# Patient Record
Sex: FEMALE | Race: WHITE | NOT HISPANIC OR LATINO | Employment: FULL TIME | URBAN - METROPOLITAN AREA
[De-identification: names, ages, dates, MRNs, and addresses within clinical notes are randomized per-mention and may not be internally consistent; named-entity substitution may affect disease eponyms.]

---

## 2018-08-22 ENCOUNTER — TRANSFERRED RECORDS (OUTPATIENT)
Dept: HEALTH INFORMATION MANAGEMENT | Facility: CLINIC | Age: 33
End: 2018-08-22

## 2018-09-11 ENCOUNTER — HOSPITAL ENCOUNTER (OUTPATIENT)
Dept: PHYSICAL THERAPY | Facility: CLINIC | Age: 33
Setting detail: THERAPIES SERIES
End: 2018-09-11
Attending: PHYSICAL MEDICINE & REHABILITATION
Payer: COMMERCIAL

## 2018-09-11 PROCEDURE — 40000718 ZZHC STATISTIC PT DEPARTMENT ORTHO VISIT: Performed by: PHYSICAL THERAPIST

## 2018-09-11 PROCEDURE — 97162 PT EVAL MOD COMPLEX 30 MIN: CPT | Mod: GP | Performed by: PHYSICAL THERAPIST

## 2018-09-11 PROCEDURE — 97110 THERAPEUTIC EXERCISES: CPT | Mod: GP | Performed by: PHYSICAL THERAPIST
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
## 2018-09-24 ENCOUNTER — HOSPITAL ENCOUNTER (OUTPATIENT)
Dept: PHYSICAL THERAPY | Facility: CLINIC | Age: 33
Setting detail: THERAPIES SERIES
End: 2018-09-24
Attending: PHYSICAL MEDICINE & REHABILITATION
Payer: COMMERCIAL

## 2018-09-24 PROCEDURE — 97112 NEUROMUSCULAR REEDUCATION: CPT | Mod: GP | Performed by: PHYSICAL THERAPIST

## 2018-09-24 PROCEDURE — 97110 THERAPEUTIC EXERCISES: CPT | Mod: GP | Performed by: PHYSICAL THERAPIST

## 2018-09-24 PROCEDURE — 40000718 ZZHC STATISTIC PT DEPARTMENT ORTHO VISIT: Performed by: PHYSICAL THERAPIST

## 2018-09-25 ENCOUNTER — HEALTH MAINTENANCE LETTER (OUTPATIENT)
Age: 33
End: 2018-09-25

## 2018-10-24 NOTE — ADDENDUM NOTE
Encounter addended by: Quin Skaggs PT on: 10/24/2018  9:47 AM<BR>     Actions taken: Sign clinical note, Episode resolved

## 2018-10-24 NOTE — DISCHARGE SUMMARY
"Outpatient Physical Therapy Discharge Note     Patient: Patricia Solano  : 1985    Beginning/End Dates of Reporting Period:  2 visits 18 and 18. She canceled or did not keep ( scheduled appt) the rest of her appts    Referring Provider: Dr. Georgia Cervantes    Therapy Diagnosis: Mechanical low back pain     Client Self Report: At the time of her last session, she reported:   She reports her neck \"cannot be touched\" right now with CTR 2018 and R a few weeks ago. She may need another neck surgery. She reprots neck was \"crushed\" onto spinal cord and she was getting \"shocks\". Reports she could be \"paralyzed\" with one wrong move and the that she is \"a mess\". She has a fractured tailbone from labor. She also thinks she has a rotated pelvis. She cannot complete positioning every 2 hours and completes it around 6 times per day. Sometimes the position is helpful and is based on what she was doing. It can make it worse at times - focuses on it. She pushes through her symptoms.     Objective Measurements:      Objective Measure: pain  Details: Back: now: 4/10 and 9/10; Neck: now:    Outcome Measures (most recent score):  Sandra STarT Sub-Score (Q5-9): 3  Sandra STarT Total Score (all 9): 6       Goals:  Goal Identifier 1   Goal Description Pt will improve her score on the Oswestry Disability Index (TACO) to 25% or less indicating a clinically meaningful improvement in overall function.   Target Date 18   Date Met      Progress:     Goal Identifier 2   Goal Description Pt will report a 30% or greater improvement in sitting and standing tolerance in order to stand for housework tasks and sit for driving or watching tv.   Target Date 18   Date Met      Progress:     Goal Identifier 3   Goal Description Pt will demonstrate independence with HEP and self-care management in order to reduce future incidences of LBP.   Target Date     Date Met      Progress:     Progress Toward Goals:   Not " assessed this period.  Plan:  Discharge from therapy.    Discharge:    Reason for Discharge: She did not keep her appts.     Equipment Issued: None    Discharge Plan: Did not complete her PT.     Thank you for referring Patricia  to [facility]    Quin Skaggs, PT  [phone]

## 2018-11-13 ENCOUNTER — MEDICAL CORRESPONDENCE (OUTPATIENT)
Dept: HEALTH INFORMATION MANAGEMENT | Facility: CLINIC | Age: 33
End: 2018-11-13

## 2018-11-13 ENCOUNTER — TRANSFERRED RECORDS (OUTPATIENT)
Dept: HEALTH INFORMATION MANAGEMENT | Facility: CLINIC | Age: 33
End: 2018-11-13

## 2018-11-18 ENCOUNTER — HOSPITAL ENCOUNTER (EMERGENCY)
Facility: CLINIC | Age: 33
End: 2018-11-18
Payer: MEDICAID

## 2018-11-18 ENCOUNTER — HOSPITAL ENCOUNTER (EMERGENCY)
Facility: CLINIC | Age: 33
Discharge: HOME | End: 2018-11-18
Attending: PHYSICIAN ASSISTANT | Admitting: PHYSICIAN ASSISTANT
Payer: COMMERCIAL

## 2018-11-18 VITALS
RESPIRATION RATE: 20 BRPM | OXYGEN SATURATION: 99 % | TEMPERATURE: 97.3 F | HEART RATE: 85 BPM | SYSTOLIC BLOOD PRESSURE: 152 MMHG | DIASTOLIC BLOOD PRESSURE: 72 MMHG

## 2018-11-18 DIAGNOSIS — B37.0 CANDIDIASIS OF MOUTH: ICD-10-CM

## 2018-11-18 PROCEDURE — 99284 EMERGENCY DEPT VISIT MOD MDM: CPT | Mod: Z6 | Performed by: PHYSICIAN ASSISTANT

## 2018-11-18 PROCEDURE — 99283 EMERGENCY DEPT VISIT LOW MDM: CPT | Performed by: PHYSICIAN ASSISTANT

## 2018-11-18 RX ORDER — FLUCONAZOLE 100 MG/1
TABLET ORAL
Qty: 11 TABLET | Refills: 0 | Status: SHIPPED | OUTPATIENT
Start: 2018-11-18 | End: 2018-12-14

## 2018-11-18 NOTE — ED AVS SNAPSHOT
[facility] Emergency Department    [address] [place] [address]    Phone:  [phone]    Fax:  [phone]                                       Patricia Solano   MRN: [id number]    Department:  [facility] Emergency Department   Date of Visit:  11/18/2018           After Visit Summary Signature Page     I have received my discharge instructions, and my questions have been answered. I have discussed any challenges I see with this plan with the nurse or doctor.    ..........................................................................................................................................  Patient/Patient Representative Signature      ..........................................................................................................................................  Patient Representative Print Name and Relationship to Patient    ..................................................               ................................................  Date                                   Time    ..........................................................................................................................................  Reviewed by Signature/Title    ...................................................              ..............................................  Date                                               Time          22EPIC Rev 08/18

## 2018-11-18 NOTE — ED AVS SNAPSHOT
[facility] Emergency Department    [address] [place] [address]    Phone:  [phone]    Fax:  [phone]                                       Patricia Solano   MRN: [id number]    Department:  [facility] Emergency Department   Date of Visit:  11/18/2018           Patient Information     Date Of Birth          1985        Your diagnoses for this visit were:     Candidiasis of mouth        You were seen by Darien Rodriguez PA-C.      Follow-up Information     Follow up with Georgia Cervantes    Specialty:  Physical Medicine and Rehab    Why:  As needed, If symptoms worsen or not improving    Contact information:    [facility]  [address]  [place] [address]  [phone]          Discharge Instructions       It was a pleasure working with you today!  I hope your condition improves rapidly!     Please start the Fluconazole to treat the thrush.  The Magic Mouthwash will help to control your symptoms initially while the other medication is setting in.  Please return for a recheck visit if you are not improving, or worsening.         Candida Infection: Thrush  Thrush is a fungal infection in the mouth and throat. Thrush does not usually affect healthy adults. It is more common in people with a weak immune system. It is also more likely if you take antibiotics. Thrush is normally not contagious.  Understanding fungus in the mouth and throat  Your mouth and throat normally contain millions of tiny organisms. These include bacteria and yeasts. Many of these do not cause any problems. In fact, they may help fight disease.  Yeasts are a type of fungus. A type of yeast called Candida normally lives on the membranes of your mouth and throat. Usually, this yeast grows only in small amounts and is harmless. But in some cases, Candida can grow out of control and cause thrush. Thrush is related to other kinds of Candida infections that can grow all over the  body. Thrush refers to an infection of only the mouth and throat.  What causes thrush?  Thrush happens when something lets too much Candida grow inside your mouth and throat. Certain things that change the normal balance of organisms in the mouth can lead to thrush. One example is antibiotic medicine. This medicine may kill some of the normal bacteria in your mouth. Candida can then grow freely. People on antibiotics have an increased risk for thrush.  You have a higher risk for thrush if you:    Wear dentures    Are getting chemotherapy    Are getting radiation therapy    Have diabetes    Have a transplanted organ    Use corticosteroids, including inhaled corticosteroids for lung disease    Have a weak immune system, such as from AIDS    Are an older adult  Symptoms of thrush  Symptoms of thrush can include:    A dry, cottony feeling in your mouth    Cracking at the corners of the mouth    Loss of taste    Pain while eating or swallowing    White patches on the tongue and around the sides of the mouth  Diagnosing thrush  Your healthcare provider will ask about your medical history and your symptoms. He or she will look closely at your mouth and throat. White or red patches will be scraped with a tongue depressor. The sample will be sent to a lab to test. This test can usually confirm thrush.  If you have thrush, you may also have esophageal candidiasis. This is common in people who have HIV or a weak immune system. Your healthcare provider may check for this condition with an upper endoscopy. This is a procedure to look at the esophagus. A tissue sample may be taken to test.  Treatment for thrush  Thrush is usually treated with antifungal medicine. The medicine is put directly in your mouth and throat. You may be given a  swish and swallow  medicine or an antifungal lozenge.  In some cases, you may need an antifungal pill. This can remove Candida throughout your body. Or you may need medicine through an intravenous  line ( IV). These treatments depend on how severe your infection is, and what other health conditions you have.  If you are at high risk for thrush, you may need to keep taking oral antifungal medicine. This is to help prevent thrush in the future.  What happens if you don t get treated for thrush?  If untreated, the Candida may spread throughout your body. They may even enter your bloodstream. This can cause serious problems, such as organ failure and even death. Bloodstream infection may need to be treated with high doses of antifungal medicine through an IV.  Systemic infection is much more likely in people who are very ill. It is also more common in those who have serious problems with their immune system. Additional risk factors for systemic infection in very ill people include:    Central venous lines    IV nutrition    Use of broad-spectrum antibiotics    Kidney failure    Recent surgery  Preventing thrush  You may be able to help prevent some cases of thrush. Make sure to:    Practice good oral hygiene. Try using a chlorhexidine mouthwash.    Clean your dentures regularly as instructed. Make sure they fit you correctly.    After using a corticosteroid inhaler, rinse out your mouth with water or mouthwash.    Do not use broad-spectrum antibiotics, if possible.    Get treated for health problems that increase your risk for thrush, such as diabetes.     When to call the healthcare provider  Call your healthcare provider right away if you have any of these:    Cottony feeling in your mouth    Loss of taste    Pain while eating or swallowing    White patches or plaques on your tongue or inside your mouth   Date Last Reviewed: 5/1/2017 2000-2018 [employer]. [address] [place] [address], [place] [address]. All rights reserved. This information is not intended as a substitute for professional medical care. Always follow your healthcare professional's instructions.          Your next 10 appointments  already scheduled     Nov 20, 2018 10:45 AM CST   Ortho Treatment with Quin Skaggs, PT   [facility] ([facility])    [address] [place] [address]  [place] [id number] [phone]              24 Hour Appointment Hotline       To make an appointment at any [facility] clinic, call [phone] ([phone]). If you don't have a family doctor or clinic, we will help you find one. [facility] are conveniently located to serve the needs of you and your family.             Review of your medicines      START taking        Dose / Directions Last dose taken    fluconazole 100 MG tablet   Commonly known as:  DIFLUCAN   Quantity:  11 tablet        2 tabs tonight, then 1 tab once daily for 10 days   Refills:  0        magic mouthwash suspension   Commonly known as:  ENTER INGREDIENTS IN COMMENTS   Dose:  5 mL   Quantity:  240 mL        Take 5 mLs by mouth every 4 hours as needed Viscous lidocaine, liquid benadryl, liquid maalox   Refills:  0                Prescriptions were sent or printed at these locations (2 Prescriptions)                   [facility] [place] [address] [place] [address]    Telephone:  [phone]   Fax:  [phone]   Hours:                  E-Prescribed (2 of 2)         fluconazole (DIFLUCAN) 100 MG tablet               magic mouthwash (ENTER INGREDIENTS IN COMMENTS) suspension                Orders Needing Specimen Collection     None      Pending Results     No orders found from 11/16/2018 to 11/19/2018.            Pending Culture Results     No orders found from 11/16/2018 to 11/19/2018.            Pending Results Instructions     If you had any lab results that were not finalized at the time of your Discharge, you can call the ED Lab Result RN at [phone]. You will be contacted by this team for any positive Lab results or changes in treatment. The nurses are available 7 days a week  from 10A to 6:30P.  You can leave a message 24 hours per day and they will return your call.        Thank you for choosing [facility]       Thank you for choosing [facility] for your care. Our goal is always to provide you with excellent care. Hearing back from our patients is one way we can continue to improve our services. Please take a few minutes to complete the written survey that you may receive in the mail after you visit with us. Thank you!        [employer] Information     [employer] gives you secure access to your electronic health record. If you see a primary care provider, you can also send messages to your care team and make appointments. If you have questions, please call your primary care clinic.  If you do not have a primary care provider, please call [phone] and they will assist you.        Care EveryWhere ID     This is your Care EveryWhere ID. This could be used by other organizations to access your [facility] medical records  [id number]        Equal Access to Services     ARCHIE WILD : Kierra Mendez, margy tariq, dulce maria juarez, celestino cheney . So [facility] [phone].    ATENCIÓN: Si habla español, tiene a finn disposición servicios gratuitos de asistencia lingüística. Llame al [phone].    We comply with applicable federal civil rights laws and [place] laws. We do not discriminate on the basis of race, color, national origin, age, disability, sex, sexual orientation, or gender identity.            After Visit Summary       This is your record. Keep this with you and show to your community pharmacist(s) and doctor(s) at your next visit.

## 2018-11-19 NOTE — DISCHARGE INSTRUCTIONS
It was a pleasure working with you today!  I hope your condition improves rapidly!     Please start the Fluconazole to treat the thrush.  The Magic Mouthwash will help to control your symptoms initially while the other medication is setting in.  Please return for a recheck visit if you are not improving, or worsening.         Candida Infection: Thrush  Thrush is a fungal infection in the mouth and throat. Thrush does not usually affect healthy adults. It is more common in people with a weak immune system. It is also more likely if you take antibiotics. Thrush is normally not contagious.  Understanding fungus in the mouth and throat  Your mouth and throat normally contain millions of tiny organisms. These include bacteria and yeasts. Many of these do not cause any problems. In fact, they may help fight disease.  Yeasts are a type of fungus. A type of yeast called Candida normally lives on the membranes of your mouth and throat. Usually, this yeast grows only in small amounts and is harmless. But in some cases, Candida can grow out of control and cause thrush. Thrush is related to other kinds of Candida infections that can grow all over the body. Thrush refers to an infection of only the mouth and throat.  What causes thrush?  Thrush happens when something lets too much Candida grow inside your mouth and throat. Certain things that change the normal balance of organisms in the mouth can lead to thrush. One example is antibiotic medicine. This medicine may kill some of the normal bacteria in your mouth. Candida can then grow freely. People on antibiotics have an increased risk for thrush.  You have a higher risk for thrush if you:    Wear dentures    Are getting chemotherapy    Are getting radiation therapy    Have diabetes    Have a transplanted organ    Use corticosteroids, including inhaled corticosteroids for lung disease    Have a weak immune system, such as from AIDS    Are an older adult  Symptoms of  thrush  Symptoms of thrush can include:    A dry, cottony feeling in your mouth    Cracking at the corners of the mouth    Loss of taste    Pain while eating or swallowing    White patches on the tongue and around the sides of the mouth  Diagnosing thrush  Your healthcare provider will ask about your medical history and your symptoms. He or she will look closely at your mouth and throat. White or red patches will be scraped with a tongue depressor. The sample will be sent to a lab to test. This test can usually confirm thrush.  If you have thrush, you may also have esophageal candidiasis. This is common in people who have HIV or a weak immune system. Your healthcare provider may check for this condition with an upper endoscopy. This is a procedure to look at the esophagus. A tissue sample may be taken to test.  Treatment for thrush  Thrush is usually treated with antifungal medicine. The medicine is put directly in your mouth and throat. You may be given a  swish and swallow  medicine or an antifungal lozenge.  In some cases, you may need an antifungal pill. This can remove Candida throughout your body. Or you may need medicine through an intravenous line ( IV). These treatments depend on how severe your infection is, and what other health conditions you have.  If you are at high risk for thrush, you may need to keep taking oral antifungal medicine. This is to help prevent thrush in the future.  What happens if you don t get treated for thrush?  If untreated, the Candida may spread throughout your body. They may even enter your bloodstream. This can cause serious problems, such as organ failure and even death. Bloodstream infection may need to be treated with high doses of antifungal medicine through an IV.  Systemic infection is much more likely in people who are very ill. It is also more common in those who have serious problems with their immune system. Additional risk factors for systemic infection in very ill  people include:    Central venous lines    IV nutrition    Use of broad-spectrum antibiotics    Kidney failure    Recent surgery  Preventing thrush  You may be able to help prevent some cases of thrush. Make sure to:    Practice good oral hygiene. Try using a chlorhexidine mouthwash.    Clean your dentures regularly as instructed. Make sure they fit you correctly.    After using a corticosteroid inhaler, rinse out your mouth with water or mouthwash.    Do not use broad-spectrum antibiotics, if possible.    Get treated for health problems that increase your risk for thrush, such as diabetes.     When to call the healthcare provider  Call your healthcare provider right away if you have any of these:    Cottony feeling in your mouth    Loss of taste    Pain while eating or swallowing    White patches or plaques on your tongue or inside your mouth   Date Last Reviewed: 5/1/2017 2000-2018 [employer]. [address] [place] [address], [place] [address]. All rights reserved. This information is not intended as a substitute for professional medical care. Always follow your healthcare professional's instructions.

## 2018-11-19 NOTE — ED PROVIDER NOTES
History     Chief Complaint   Patient presents with     Pharyngitis     HPI  Patricia Solano is a 32 year old female who who presents for evaluation of oral discomfort starting yesterday and progressing throughout the day today.  Has gotten the point where she even feels some discomfort when she swallows.  She has noted some dryness of her mouth and some white caking material developing on her tongue.  Denies any current fever, chills, otalgia, rhinorrhea, congestion, sinus pain, or cough.  Reports being on a Medrol Dosepak for a lumbar sprain recently.  She just finished that yesterday.  Was also consuming cough drops on a regular basis over the past 6 days due to URI symptoms at the time.  Has never had this condition before.  Denies any difficulty swallowing or regurgitation.  No abdominal pain.        Problem List:    There are no active problems to display for this patient.       Past Medical History:    No past medical history on file.    Past Surgical History:    No past surgical history on file.    Family History:    No family history on file.    Social History:  Marital Status:  Single [1]  Social History   Substance Use Topics     Smoking status: Not on file     Smokeless tobacco: Not on file     Alcohol use Not on file        Medications:      fluconazole (DIFLUCAN) 100 MG tablet   magic mouthwash (ENTER INGREDIENTS IN COMMENTS) suspension         Review of Systems   All other systems reviewed and are negative.      Physical Exam   BP: 152/72  Pulse: 85  Temp: 97.3  F (36.3  C)  Resp: 20  SpO2: (!) 20 %    Oxygen saturation was 99%.  20% must of been a typo.  Patient is not cyanotic, not tachypneic, and is breathing normally.  Physical Exam   Constitutional: No distress.   HENT:   Head: Normocephalic and atraumatic.   Right Ear: External ear normal.   Left Ear: External ear normal.   Nose: Nose normal.   Mouth/Throat: No oropharyngeal exudate.   White adherent material on the tongue.  She also has  scattered petechial lesions on the soft palate and 3 white adherent patches on the right soft palate.  No tonsil tissue present and there is no oropharyngeal edema or asymmetry identified.  Patient can swallow normally.   Eyes: Conjunctivae and EOM are normal. Pupils are equal, round, and reactive to light. Right eye exhibits no discharge. Left eye exhibits no discharge. No scleral icterus.   Neck: Normal range of motion. Neck supple. No thyromegaly present.   Cardiovascular: Normal rate, regular rhythm, normal heart sounds and intact distal pulses.    Pulmonary/Chest: Effort normal and breath sounds normal. No respiratory distress. She has no wheezes. She has no rales. She exhibits no tenderness.   Abdominal: Soft. Bowel sounds are normal. She exhibits no distension and no mass. There is no tenderness. There is no rebound and no guarding.   Musculoskeletal: She exhibits no edema or tenderness.   Lymphadenopathy:     She has no cervical adenopathy.   Skin: Skin is warm. No rash noted. She is not diaphoretic.   Nursing note and vitals reviewed.      ED Course     ED Course     Procedures               Critical Care time:  none               No results found for this or any previous visit (from the past 24 hour(s)).    Medications - No data to display    Assessments & Plan (with Medical Decision Making)  Candidiasis of mouth     32 year old female presents for evaluation of oral discomfort and white adherent patches on the tongue starting yesterday and worsening throughout the day today.  Just finished a course of a Medrol Dosepak for a lumbar sprain.  Reports getting significant illness when she develops URIs, and frequent illnesses ever since she had chemotherapy for a previous cancer.  She denies any recurrence of her cancer.  Patient does report eating multiple cough drops for the 6 days while she was on the Medrol Dosepak.  On exam she is afebrile with a temperature of 97.3.  White adherent patches on the tongue  and a couple white adherent patches on the soft palate with significant palatal petechiae.  No tonsillar tissue present and there is no oral pharyngeal edema.  No asymmetry.  No significant cervical adenopathy.  Patient can swallow normally, but she has an abnormal feeling down into the laryngeal area when she does swallow.  Patient clinically has oral candidiasis, and is at higher risk for this with her distant past history of chemotherapy, multiple sugar candies over the last 6 days, and steroid therapy.  Given the likely extension of this down into the laryngeal area, I am going to be more aggressive and treat her with fluconazole 200 mg this evening and then 100 mg daily for 10 days total.  Possible side effects of medication discussed.  Patient states that she has had liver function testing within the last year, and reports never having any troubles.  She does not drink alcohol regularly, and will abstain from this while taking the medication.  I also gave her Magic mouthwash to help treat her current symptoms.  Indications to return for repeat evaluation were discussed with her in detail.  The patient was in agreement with this plan and was suitable for discharge.     I have reviewed the nursing notes.    I have reviewed the findings, diagnosis, plan and need for follow up with the patient.       Discharge Medication List as of 11/18/2018 11:29 PM      START taking these medications    Details   fluconazole (DIFLUCAN) 100 MG tablet 2 tabs tonight, then 1 tab once daily for 10 days, Disp-11 tablet, R-0, E-Prescribe      magic mouthwash (ENTER INGREDIENTS IN COMMENTS) suspension Take 5 mLs by mouth every 4 hours as needed Viscous lidocaine, liquid benadryl, liquid maalox, Disp-240 mL, R-0, E-Prescribe             Final diagnoses:   Candidiasis of mouth       Disclaimer: This note consists of symbols derived from keyboarding, dictation and/or voice recognition software. As a result, there may be errors in the  script that have gone undetected. Please consider this when interpreting information found in this chart.      11/18/2018   Darien Rodriguez PA-C   [facility] EMERGENCY DEPARTMENT     Darien Rodriguez PA-C  11/19/18 0036

## 2018-12-05 ENCOUNTER — HOSPITAL ENCOUNTER (OUTPATIENT)
Dept: PHYSICAL THERAPY | Facility: CLINIC | Age: 33
Setting detail: THERAPIES SERIES
End: 2018-12-05
Attending: PHYSICAL MEDICINE & REHABILITATION
Payer: COMMERCIAL

## 2018-12-05 PROCEDURE — 97112 NEUROMUSCULAR REEDUCATION: CPT | Mod: GP | Performed by: PHYSICAL THERAPIST

## 2018-12-05 PROCEDURE — 97162 PT EVAL MOD COMPLEX 30 MIN: CPT | Mod: GP | Performed by: PHYSICAL THERAPIST

## 2018-12-05 PROCEDURE — 40000718 ZZHC STATISTIC PT DEPARTMENT ORTHO VISIT: Performed by: PHYSICAL THERAPIST

## 2018-12-05 NOTE — PROGRESS NOTES
12/05/18 1009   General Information   Type of Visit Initial OP Ortho PT Evaluation   Start of Care Date 12/05/18   Referring Physician Georgia Cervantes MD   Patient/Family Goals Statement be able to move again and to sleep   Orders Evaluate and Treat   Orders Comment 2 times per week x 2 months. education, manual therapy, modalities, therapeutic exercises/activities, traction trial, modalities to help pain - E-stim, US, traction, iontophoresis; body mechanics, corrective actions, stretching/mobility, stabilization/strengtheing, transfers /balance, condiitoning, dynamic lumbar stabilization   Date of Order 11/13/18   Insurance Type Other   Insurance Comments/Visits Authorized BLUE PLUS   Medical Diagnosis ACute radicular low back pain   Surgical/Medical history reviewed Yes   Precautions/Limitations no known precautions/limitations   Weight-Bearing Status - LUE full weight-bearing   Weight-Bearing Status - RUE full weight-bearing   Weight-Bearing Status - LLE full weight-bearing   Weight-Bearing Status - RLE full weight-bearing   General Information Comments Gestational high BP, no history diabetes; 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        Present No   Body Part(s)   Body Part(s) Lumbar Spine/SI   Presentation and Etiology   Pertinent history of current problem (include personal factors and/or comorbidities that impact the POC) 34 yo female with history as noted above. She reports she was picking 6 bottles of drinking water in a bag and felt severe pain and could not move.  No current treatment or exercise - stretching into side bending bilaterally, forward and backward stretches as well as 4 point pelvic tilts which aggravate it. Tries to soak in hot water and this is helpful until she is out  of the water. She is also using thermal wraps. Sneezing increases symptoms. No change with coughing or bowel movement. No signs of cauda equina syndrome at this time. She reports she initially had foot drop on the L.    Impairments A. Pain;F. Decreased strength and endurance   Functional Limitations perform activities of daily living;perform required work activities;perform desired leisure / sports activities   Symptom Location L 3-4 L side and the symptoms radiate down the back of her leg or wraps around the L thigh to lateral aspect and ends in the ankle.    How/Where did it occur While lifting  (flexed position, lifting with R hand. )   Onset date of current episode/exacerbation 11/11/18  (approximate date)   Chronicity Chronic   Pain rating (0-10 point scale) Other   Pain rating comment now: 6/10, range: 4/10 to 8/10   Pain quality A. Sharp;B. Dull;C. Aching;F. Stabbing   Frequency of pain/symptoms A. Constant   Pain/symptoms are: Other   Pain symptoms comment more activity and positional related   Pain/symptoms exacerbated by M. Other   Pain exacerbation comment Standing, walking, lifting, sitting, ADLs, riding in a car, social life, rolling and getting out of bed   Pain/symptoms eased by G. Heat   Pain eased by comment Massages are temporary relief   Progression of symptoms since onset: Improved  (initially and now plateaued)   Current / Previous Interventions   Diagnostic Tests: MRI   MRI Results Results   MRI results FLuid along the lower 3 levels of spine and included L4-5 - per patient report/understanding   Prior Level of Function   Functional Level Prior Comment independent with treatments for neck issues   Current Level of Function   Current Community Support Family/friend caregiver   Patient role/employment history C. 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   Home/community accessibility drives, no concerns   Fall Risk Screen   Fall screen completed by PT   Have you fallen 2  or more times in the past year? No   Have you fallen and had an injury in the past year? No   Is patient a fall risk? No   Abuse Risk Screen   QUESTION TO PATIENT:  Has a member of your family or a partner(now or in the past) intimidated, hurt, manipulated, or controlled you in any way? no   QUESTION TO PATIENT: Do you feel safe going back to the place where you are living? yes   OBSERVATION: Is there reason to believe there has been maltreatment of a vulnerable adult (ie. Physical/Sexual/Emotional abuse, self neglect, lack of adequate food, shelter, medical care, or financial exploitation)? no   System Outcome Measures   Outcome Measures Low Back Pain (see Oswestry and Sandra)   Lumbar Spine/SI Objective Findings   Observation moving and changing places leaning against the wall.    Integumentary negative   Posture forward head, increased cervical and lumbar lordosis with L elevation of the iliac crest compared to the R with slight ER of the L femur compared to the R.    Flexion ROM 36 degrees with end range pain   Extension ROM 5 degrees with tense pain in the L4-5 area.    Pelvic Screen Positive compression test, positive Gaeslen's and thigh thrust on R. Posterior rotation R innominant compared to the L.    Planned Therapy Interventions   Planned Therapy Interventions Comment Manual therapy, stabilization, back care for positioning and posture   Planned Modality Interventions   Planned Modality Interventions Comments as needed   Clinical Impression   Criteria for Skilled Therapeutic Interventions Met yes, treatment indicated   PT Diagnosis Pelvic obliquity with low back symptoms   Influenced by the following impairments Neck pain, history of coccyx fracture   Functional limitations due to impairments walking, standing, sitting, rising from sit and especially rolling and getting out of bed, sleeping   Clinical Presentation Evolving/Changing   Clinical Presentation Rationale Pelvic position, neck pain, low back and  pelvic pain   Clinical Decision Making (Complexity) Moderate complexity   Predicted Duration of Therapy Intervention (days/wks) 2 times per week x 8 weeks   Risk & Benefits of therapy have been explained Yes   Patient, Family & other staff in agreement with plan of care Yes   Clinical Impression Comments 34 yo female with neck symptoms for which she is seeing PT. She is reporting acture low back pain from bending and lifting. She had positive SIJ symptoms and her pelvis was easily corrected with muscle energy techniques and she will be able to continue with this aat home. She also had less symptoms with proper rolling and sit<->stand technique. We discussed holding traction at this time (mechanical) until her SIJs are in better position adn more stable with the core exercises.    Education Assessment   Preferred Learning Style Reading   Barriers to Learning No barriers   ORTHO GOALS   PT Ortho Eval Goals 1;2;3;4   Ortho Goal 1   Goal Identifier SLeeping   Goal Description Patricia will be able to use sleep hygiene concepts to find a comfortable position allowing her to sleep at her baseline   Target Date 12/28/18   Ortho Goal 2   Goal Identifier Transfers   Goal Description Using good positioning and trunk stabilization, Patricia will be able to roll in bed, move sit<->stand with 50% decrease in symtoms   Target Date 12/26/18   Ortho Goal 3   Goal Identifier Walking and standing   Goal Description Patricia will be able to complete a home program as instructed so she can walk 1 mile and stand at least 20 minutes without increased symptoms   Target Date 01/30/19   Total Evaluation Time   Total Evaluation Time 22     Thank you for referring Patricia  to [facility], [facility]  [phone]

## 2018-12-14 ENCOUNTER — APPOINTMENT (OUTPATIENT)
Dept: GENERAL RADIOLOGY | Facility: CLINIC | Age: 33
End: 2018-12-14
Attending: EMERGENCY MEDICINE
Payer: COMMERCIAL

## 2018-12-14 ENCOUNTER — HOSPITAL ENCOUNTER (OUTPATIENT)
Facility: CLINIC | Age: 33
Setting detail: OBSERVATION
LOS: 1 days | Discharge: HOME | End: 2018-12-15
Attending: EMERGENCY MEDICINE | Admitting: FAMILY MEDICINE
Payer: COMMERCIAL

## 2018-12-14 DIAGNOSIS — R50.9 FEVER, UNSPECIFIED FEVER CAUSE: ICD-10-CM

## 2018-12-14 DIAGNOSIS — J18.8 OTHER PNEUMONIA, UNSPECIFIED ORGANISM: ICD-10-CM

## 2018-12-14 DIAGNOSIS — G89.29 CHRONIC NECK PAIN: ICD-10-CM

## 2018-12-14 DIAGNOSIS — J18.9 COMMUNITY ACQUIRED PNEUMONIA OF RIGHT LOWER LOBE OF LUNG: ICD-10-CM

## 2018-12-14 DIAGNOSIS — R51.9 FACIAL PAIN: ICD-10-CM

## 2018-12-14 DIAGNOSIS — R07.1 CHEST PAIN ON BREATHING: Primary | ICD-10-CM

## 2018-12-14 DIAGNOSIS — R51.9 HEADACHE, TEMPORAL: ICD-10-CM

## 2018-12-14 DIAGNOSIS — M54.2 CHRONIC NECK PAIN: ICD-10-CM

## 2018-12-14 PROBLEM — R00.0 TACHYCARDIA: Status: ACTIVE | Noted: 2018-12-14

## 2018-12-14 LAB
ALBUMIN SERPL-MCNC: 3.6 G/DL (ref 3.4–5)
ALP SERPL-CCNC: 117 U/L (ref 40–150)
ALT SERPL W P-5'-P-CCNC: 46 U/L (ref 0–50)
ANION GAP SERPL CALCULATED.3IONS-SCNC: 9 MMOL/L (ref 3–14)
AST SERPL W P-5'-P-CCNC: 34 U/L (ref 0–45)
BASOPHILS # BLD AUTO: 0 10E9/L (ref 0–0.2)
BASOPHILS NFR BLD AUTO: 0.4 %
BILIRUB SERPL-MCNC: 0.6 MG/DL (ref 0.2–1.3)
BUN SERPL-MCNC: 10 MG/DL (ref 7–30)
CALCIUM SERPL-MCNC: 8.8 MG/DL (ref 8.5–10.1)
CHLORIDE SERPL-SCNC: 102 MMOL/L (ref 94–109)
CO2 SERPL-SCNC: 25 MMOL/L (ref 20–32)
CREAT SERPL-MCNC: 0.81 MG/DL (ref 0.52–1.04)
DIFFERENTIAL METHOD BLD: ABNORMAL
EOSINOPHIL NFR BLD AUTO: 0.1 %
ERYTHROCYTE [DISTWIDTH] IN BLOOD BY AUTOMATED COUNT: 12.4 % (ref 10–15)
ERYTHROCYTE [SEDIMENTATION RATE] IN BLOOD BY WESTERGREN METHOD: 24 MM/H (ref 0–20)
FLUAV+FLUBV AG SPEC QL: NEGATIVE
FLUAV+FLUBV AG SPEC QL: NEGATIVE
GFR SERPL CREATININE-BSD FRML MDRD: 81 ML/MIN/1.7M2
GLUCOSE SERPL-MCNC: 99 MG/DL (ref 70–99)
HCT VFR BLD AUTO: 39.5 % (ref 35–47)
HGB BLD-MCNC: 13.6 G/DL (ref 11.7–15.7)
IMM GRANULOCYTES # BLD: 0 10E9/L (ref 0–0.4)
IMM GRANULOCYTES NFR BLD: 0.3 %
LACTATE BLD-SCNC: 0.8 MMOL/L (ref 0.7–2)
LACTATE BLD-SCNC: 1 MMOL/L (ref 0.7–2)
LYMPHOCYTES # BLD AUTO: 1.1 10E9/L (ref 0.8–5.3)
LYMPHOCYTES NFR BLD AUTO: 9.8 %
MCH RBC QN AUTO: 30 PG (ref 26.5–33)
MCHC RBC AUTO-ENTMCNC: 34.4 G/DL (ref 31.5–36.5)
MCV RBC AUTO: 87 FL (ref 78–100)
MONOCYTES # BLD AUTO: 0.7 10E9/L (ref 0–1.3)
MONOCYTES NFR BLD AUTO: 6.5 %
NEUTROPHILS # BLD AUTO: 8.9 10E9/L (ref 1.6–8.3)
NEUTROPHILS NFR BLD AUTO: 82.9 %
NRBC # BLD AUTO: 0 10*3/UL
NRBC BLD AUTO-RTO: 0 /100
PLATELET # BLD AUTO: 265 10E9/L (ref 150–450)
POTASSIUM SERPL-SCNC: 3.6 MMOL/L (ref 3.4–5.3)
PROCALCITONIN SERPL-MCNC: 0.06 NG/ML
PROT SERPL-MCNC: 7.6 G/DL (ref 6.8–8.8)
RBC # BLD AUTO: 4.53 10E12/L (ref 3.8–5.2)
SODIUM SERPL-SCNC: 136 MMOL/L (ref 133–144)
SPECIMEN SOURCE: NORMAL
WBC # BLD AUTO: 10.8 10E9/L (ref 4–11)

## 2018-12-14 PROCEDURE — 36415 COLL VENOUS BLD VENIPUNCTURE: CPT | Performed by: INTERNAL MEDICINE

## 2018-12-14 PROCEDURE — 25000132 ZZH RX MED GY IP 250 OP 250 PS 637: Performed by: FAMILY MEDICINE

## 2018-12-14 PROCEDURE — 80053 COMPREHEN METABOLIC PANEL: CPT | Performed by: EMERGENCY MEDICINE

## 2018-12-14 PROCEDURE — 85025 COMPLETE CBC W/AUTO DIFF WBC: CPT | Performed by: EMERGENCY MEDICINE

## 2018-12-14 PROCEDURE — 96361 HYDRATE IV INFUSION ADD-ON: CPT | Performed by: EMERGENCY MEDICINE

## 2018-12-14 PROCEDURE — 96367 TX/PROPH/DG ADDL SEQ IV INF: CPT | Performed by: EMERGENCY MEDICINE

## 2018-12-14 PROCEDURE — 99285 EMERGENCY DEPT VISIT HI MDM: CPT | Mod: 25 | Performed by: EMERGENCY MEDICINE

## 2018-12-14 PROCEDURE — 99285 EMERGENCY DEPT VISIT HI MDM: CPT | Mod: Z6 | Performed by: EMERGENCY MEDICINE

## 2018-12-14 PROCEDURE — G0378 HOSPITAL OBSERVATION PER HR: HCPCS

## 2018-12-14 PROCEDURE — 87804 INFLUENZA ASSAY W/OPTIC: CPT | Performed by: EMERGENCY MEDICINE

## 2018-12-14 PROCEDURE — 25800025 ZZH RX 258: Performed by: FAMILY MEDICINE

## 2018-12-14 PROCEDURE — 96365 THER/PROPH/DIAG IV INF INIT: CPT | Performed by: EMERGENCY MEDICINE

## 2018-12-14 PROCEDURE — 99219 ZZC INITIAL OBSERVATION CARE,LEVL II: CPT | Mod: AI | Performed by: FAMILY MEDICINE

## 2018-12-14 PROCEDURE — 83605 ASSAY OF LACTIC ACID: CPT | Performed by: INTERNAL MEDICINE

## 2018-12-14 PROCEDURE — 96375 TX/PRO/DX INJ NEW DRUG ADDON: CPT | Performed by: EMERGENCY MEDICINE

## 2018-12-14 PROCEDURE — 25000128 H RX IP 250 OP 636: Performed by: EMERGENCY MEDICINE

## 2018-12-14 PROCEDURE — 96375 TX/PRO/DX INJ NEW DRUG ADDON: CPT

## 2018-12-14 PROCEDURE — 99207 ZZC MOONLIGHTING INDICATOR: CPT | Performed by: FAMILY MEDICINE

## 2018-12-14 PROCEDURE — 25000128 H RX IP 250 OP 636: Performed by: INTERNAL MEDICINE

## 2018-12-14 PROCEDURE — 85652 RBC SED RATE AUTOMATED: CPT | Performed by: FAMILY MEDICINE

## 2018-12-14 PROCEDURE — 25000128 H RX IP 250 OP 636: Performed by: FAMILY MEDICINE

## 2018-12-14 PROCEDURE — 83605 ASSAY OF LACTIC ACID: CPT | Mod: 91 | Performed by: EMERGENCY MEDICINE

## 2018-12-14 PROCEDURE — 25000132 ZZH RX MED GY IP 250 OP 250 PS 637: Performed by: EMERGENCY MEDICINE

## 2018-12-14 PROCEDURE — 71046 X-RAY EXAM CHEST 2 VIEWS: CPT | Mod: TC

## 2018-12-14 PROCEDURE — 84145 PROCALCITONIN (PCT): CPT | Performed by: EMERGENCY MEDICINE

## 2018-12-14 RX ORDER — CEFTRIAXONE 2 G/1
2 INJECTION, POWDER, FOR SOLUTION INTRAMUSCULAR; INTRAVENOUS EVERY 24 HOURS
Status: DISCONTINUED | OUTPATIENT
Start: 2018-12-14 | End: 2018-12-14

## 2018-12-14 RX ORDER — ACETAMINOPHEN 325 MG/1
650 TABLET ORAL EVERY 4 HOURS PRN
Status: DISCONTINUED | OUTPATIENT
Start: 2018-12-14 | End: 2018-12-15

## 2018-12-14 RX ORDER — ACETAMINOPHEN 650 MG/1
650 SUPPOSITORY RECTAL EVERY 4 HOURS PRN
Status: DISCONTINUED | OUTPATIENT
Start: 2018-12-14 | End: 2018-12-15 | Stop reason: HOSPADM

## 2018-12-14 RX ORDER — ACETAMINOPHEN 500 MG
500-1000 TABLET ORAL EVERY 6 HOURS PRN
COMMUNITY

## 2018-12-14 RX ORDER — IBUPROFEN 600 MG/1
600 TABLET, FILM COATED ORAL EVERY 6 HOURS PRN
Status: DISCONTINUED | OUTPATIENT
Start: 2018-12-14 | End: 2018-12-15 | Stop reason: HOSPADM

## 2018-12-14 RX ORDER — CEFTRIAXONE 2 G/1
2 INJECTION, POWDER, FOR SOLUTION INTRAMUSCULAR; INTRAVENOUS EVERY 24 HOURS
Status: DISCONTINUED | OUTPATIENT
Start: 2018-12-15 | End: 2018-12-15 | Stop reason: HOSPADM

## 2018-12-14 RX ORDER — DOCUSATE SODIUM 100 MG/1
100 CAPSULE, LIQUID FILLED ORAL 2 TIMES DAILY
Status: DISCONTINUED | OUTPATIENT
Start: 2018-12-14 | End: 2018-12-15 | Stop reason: HOSPADM

## 2018-12-14 RX ORDER — ONDANSETRON 4 MG/1
4 TABLET, ORALLY DISINTEGRATING ORAL EVERY 6 HOURS PRN
Status: DISCONTINUED | OUTPATIENT
Start: 2018-12-14 | End: 2018-12-15 | Stop reason: HOSPADM

## 2018-12-14 RX ORDER — NALOXONE HYDROCHLORIDE 0.4 MG/ML
.1-.4 INJECTION, SOLUTION INTRAMUSCULAR; INTRAVENOUS; SUBCUTANEOUS
Status: DISCONTINUED | OUTPATIENT
Start: 2018-12-14 | End: 2018-12-15 | Stop reason: HOSPADM

## 2018-12-14 RX ORDER — ACETAMINOPHEN 500 MG
500-1000 TABLET ORAL EVERY 6 HOURS PRN
Status: DISCONTINUED | OUTPATIENT
Start: 2018-12-14 | End: 2018-12-15 | Stop reason: HOSPADM

## 2018-12-14 RX ORDER — OXYCODONE HYDROCHLORIDE 5 MG/1
5-10 TABLET ORAL
Status: DISCONTINUED | OUTPATIENT
Start: 2018-12-14 | End: 2018-12-15 | Stop reason: HOSPADM

## 2018-12-14 RX ORDER — LIDOCAINE 40 MG/G
CREAM TOPICAL
Status: DISCONTINUED | OUTPATIENT
Start: 2018-12-14 | End: 2018-12-14

## 2018-12-14 RX ORDER — DEXTROSE MONOHYDRATE, SODIUM CHLORIDE, AND POTASSIUM CHLORIDE 50; 1.49; 4.5 G/1000ML; G/1000ML; G/1000ML
INJECTION, SOLUTION INTRAVENOUS CONTINUOUS
Status: DISCONTINUED | OUTPATIENT
Start: 2018-12-14 | End: 2018-12-15 | Stop reason: HOSPADM

## 2018-12-14 RX ORDER — AMOXICILLIN 250 MG
1 CAPSULE ORAL 2 TIMES DAILY PRN
Status: DISCONTINUED | OUTPATIENT
Start: 2018-12-14 | End: 2018-12-15 | Stop reason: HOSPADM

## 2018-12-14 RX ORDER — TIZANIDINE 2 MG/1
4 TABLET ORAL AT BEDTIME
Status: DISCONTINUED | OUTPATIENT
Start: 2018-12-14 | End: 2018-12-15 | Stop reason: HOSPADM

## 2018-12-14 RX ORDER — DIPHENHYDRAMINE HYDROCHLORIDE 50 MG/ML
25 INJECTION INTRAMUSCULAR; INTRAVENOUS ONCE
Status: COMPLETED | OUTPATIENT
Start: 2018-12-14 | End: 2018-12-14

## 2018-12-14 RX ORDER — AMOXICILLIN 250 MG
2 CAPSULE ORAL 2 TIMES DAILY PRN
Status: DISCONTINUED | OUTPATIENT
Start: 2018-12-14 | End: 2018-12-15 | Stop reason: HOSPADM

## 2018-12-14 RX ORDER — OXYCODONE AND ACETAMINOPHEN 5; 325 MG/1; MG/1
2 TABLET ORAL ONCE
Status: COMPLETED | OUTPATIENT
Start: 2018-12-14 | End: 2018-12-14

## 2018-12-14 RX ORDER — HYDROCODONE BITARTRATE AND ACETAMINOPHEN 5; 325 MG/1; MG/1
2 TABLET ORAL ONCE
Status: DISCONTINUED | OUTPATIENT
Start: 2018-12-14 | End: 2018-12-14

## 2018-12-14 RX ORDER — MORPHINE SULFATE 2 MG/ML
2 INJECTION, SOLUTION INTRAMUSCULAR; INTRAVENOUS
Status: DISCONTINUED | OUTPATIENT
Start: 2018-12-14 | End: 2018-12-15 | Stop reason: HOSPADM

## 2018-12-14 RX ORDER — CEFTRIAXONE SODIUM 2 G/50ML
2 INJECTION, SOLUTION INTRAVENOUS EVERY 24 HOURS
Status: DISCONTINUED | OUTPATIENT
Start: 2018-12-15 | End: 2018-12-14

## 2018-12-14 RX ORDER — ONDANSETRON 2 MG/ML
4 INJECTION INTRAMUSCULAR; INTRAVENOUS EVERY 6 HOURS PRN
Status: DISCONTINUED | OUTPATIENT
Start: 2018-12-14 | End: 2018-12-15 | Stop reason: HOSPADM

## 2018-12-14 RX ORDER — KETOROLAC TROMETHAMINE 30 MG/ML
30 INJECTION, SOLUTION INTRAMUSCULAR; INTRAVENOUS ONCE
Status: COMPLETED | OUTPATIENT
Start: 2018-12-14 | End: 2018-12-14

## 2018-12-14 RX ORDER — HYDROMORPHONE HYDROCHLORIDE 1 MG/ML
0.5 INJECTION, SOLUTION INTRAMUSCULAR; INTRAVENOUS; SUBCUTANEOUS
Status: COMPLETED | OUTPATIENT
Start: 2018-12-14 | End: 2018-12-14

## 2018-12-14 RX ORDER — TIZANIDINE HYDROCHLORIDE 4 MG/1
4 CAPSULE, GELATIN COATED ORAL AT BEDTIME
COMMUNITY
Start: 2018-07-12

## 2018-12-14 RX ORDER — METOCLOPRAMIDE HYDROCHLORIDE 5 MG/ML
10 INJECTION INTRAMUSCULAR; INTRAVENOUS ONCE
Status: COMPLETED | OUTPATIENT
Start: 2018-12-14 | End: 2018-12-14

## 2018-12-14 RX ADMIN — SODIUM CHLORIDE 1000 ML: 9 INJECTION, SOLUTION INTRAVENOUS at 11:29

## 2018-12-14 RX ADMIN — METOCLOPRAMIDE 10 MG: 5 INJECTION, SOLUTION INTRAMUSCULAR; INTRAVENOUS at 13:49

## 2018-12-14 RX ADMIN — OXYCODONE HYDROCHLORIDE 10 MG: 5 TABLET ORAL at 21:54

## 2018-12-14 RX ADMIN — DOCUSATE SODIUM 100 MG: 100 CAPSULE, LIQUID FILLED ORAL at 20:26

## 2018-12-14 RX ADMIN — MORPHINE SULFATE 2 MG: 2 INJECTION, SOLUTION INTRAMUSCULAR; INTRAVENOUS at 18:50

## 2018-12-14 RX ADMIN — AZITHROMYCIN MONOHYDRATE 500 MG: 500 INJECTION, POWDER, LYOPHILIZED, FOR SOLUTION INTRAVENOUS at 13:16

## 2018-12-14 RX ADMIN — DIPHENHYDRAMINE HYDROCHLORIDE 25 MG: 50 INJECTION, SOLUTION INTRAMUSCULAR; INTRAVENOUS at 13:46

## 2018-12-14 RX ADMIN — OXYCODONE HYDROCHLORIDE AND ACETAMINOPHEN 2 TABLET: 5; 325 TABLET ORAL at 14:08

## 2018-12-14 RX ADMIN — Medication 0.5 MG: at 13:05

## 2018-12-14 RX ADMIN — SODIUM CHLORIDE 1000 ML: 9 INJECTION, SOLUTION INTRAVENOUS at 12:38

## 2018-12-14 RX ADMIN — KETOROLAC TROMETHAMINE 30 MG: 30 INJECTION, SOLUTION INTRAMUSCULAR at 11:30

## 2018-12-14 RX ADMIN — CEFTRIAXONE SODIUM 2 G: 2 INJECTION, POWDER, FOR SOLUTION INTRAMUSCULAR; INTRAVENOUS at 12:42

## 2018-12-14 RX ADMIN — ACETAMINOPHEN 1000 MG: 500 TABLET ORAL at 18:50

## 2018-12-14 RX ADMIN — POTASSIUM CHLORIDE, DEXTROSE MONOHYDRATE AND SODIUM CHLORIDE: 150; 5; 450 INJECTION, SOLUTION INTRAVENOUS at 17:00

## 2018-12-14 RX ADMIN — IBUPROFEN 600 MG: 600 TABLET ORAL at 20:20

## 2018-12-14 RX ADMIN — OXYCODONE HYDROCHLORIDE 5 MG: 5 TABLET ORAL at 16:53

## 2018-12-14 RX ADMIN — SODIUM CHLORIDE, POTASSIUM CHLORIDE, SODIUM LACTATE AND CALCIUM CHLORIDE 1000 ML: 600; 310; 30; 20 INJECTION, SOLUTION INTRAVENOUS at 20:29

## 2018-12-14 RX ADMIN — TIZANIDINE 4 MG: 2 TABLET ORAL at 20:29

## 2018-12-14 ASSESSMENT — MIFFLIN-ST. JEOR: SCORE: 1613.25

## 2018-12-14 ASSESSMENT — ACTIVITIES OF DAILY LIVING (ADL): ADLS_ACUITY_SCORE: 14

## 2018-12-14 NOTE — H&P
[facility]    History and Physical - Hospitalist Service       Date of Admission:  12/14/2018    Assessment & Plan   Patricia Solano is a 33 year old female admitted on 12/14/2018. She has a one-week history of cough.  Now with increasing shortness of breath and worsening fevers.  Infiltrate noted on x-ray.  This is all clinically consistent with a community-acquired pneumonia.  It is unclear if this is viral or bacterial at this time.    Active Problems:    Community acquired pneumonia of right lower lobe of lung (H)    Tachycardia    Fever    Headache, temporal    Chronic neck pain    Given her high fevers and significant tachycardia on presentation, will admit to observation to ensure that she is improving.  She is not hypoxic at this time.  If she requires oxygen, will likely need to be changed to inpatient status.  We will continue Rocephin and Zithromax which were started in the ER as these are appropriate antibiotics for her condition.  We will also give IV fluids overnight.  Her headache is not responded well to pain medication in the ER.  I wonder if this is a tension headache.  Will consider muscle relaxants if further rest and treatment of her underlying infection does not improve this.  At this time, I am not concerned about meningismus.  She states that her neck pain is stable and chronic.  We will also obtain a sed rate to ensure that this is not a highly atypical presentation of temporal arteritis or some other inflammatory process.  We will continue her home tizanidine for chronic neck pain and consider other options if needed.    Portions of this note were completed using Dragon dictation software.  Although reviewed, there may be typographical and other inadvertent errors that remain.        Diet: regular  DVT Prophylaxis: Low Risk/Ambulatory with no VTE prophylaxis indicated and Ambulate every shift  Renee Catheter: not present  Code Status: Full    Disposition  Plan   Expected discharge: Tomorrow, recommended to prior living arrangement once vitals are stable and clinically improving..  Entered: Alcides Win MD, MD 12/14/2018, 2:29 PM     The patient's care was discussed with the Patient.    Alcides Win MD, MD  [facility]    ______________________________________________________________________    Chief Complaint   Malaise, fever, cough    History is obtained from the patient    History of Present Illness   Patricia Solano is a 33 year old female who presents with a one week h/o URI symptoms (primarily cough).  Has been increasingly short of breath over the last 4 days.  Yesterday developed a fever, got up to 104 degrees overnight.  Took Tylenol and ibuprofen and came to the ER for evaluation.      Has tried some OVER-THE-COUNTER cold medications without any improvement.      Son has pneumonia, being treated with amoxicillin since Monday of this week.  Slowly improving.      Also has a severe HA.  This started yesterday with her fever, hasn't gone away since that time.  Pounding pain in both temples and in the front of her head.  Received multiple pain medications for this in the ER without improvement.      Review of Systems    The 10 point Review of Systems is negative other than noted in the HPI or here. Chest discomfort, as above, tachycardia.    Past Medical History    I have reviewed this patient's medical history and updated it with pertinent information if needed.   Past Medical History:   Diagnosis Date     Breast disorder     biopsy     Choriocarcinoma 2004     Seizures (H)     as 3 y/o, none since, unk cause       Past Surgical History   I have reviewed this patient's surgical history and updated it with pertinent information if needed.  Past Surgical History:   Procedure Laterality Date     C RAD RESEC TONSIL/PILLARS      2006     CERVICAL ARTHOPLASTY/ ARTIFIC DISC, SINGLE       CYSTOSCOPY, BIOPSY BLADDER,  COMBINED      2011     D&C      2004,2007,2009     LUMPECTOMY BREAST      2002       Social History   I have reviewed this patient's social history and updated it with pertinent information if needed.  Social History     Tobacco Use     Smoking status: Never Smoker   Substance Use Topics     Alcohol use: No     Drug use: No       Family History   I have reviewed this patient's family history and updated it with pertinent information if needed.   Family History   Problem Relation Age of Onset     Cancer Maternal Grandmother         Pancreatic     Hypertension Paternal Grandfather      Thyroid Disease Paternal Grandfather      Thyroid Disease Paternal Grandmother      Obesity Paternal Grandmother      Arthritis Paternal Grandmother         RA     Kidney Disease Paternal Grandmother        Prior to Admission Medications   Prior to Admission Medications   Prescriptions Last Dose Informant Patient Reported? Taking?   acetaminophen (TYLENOL) 500 MG tablet 12/14/2018 at 0830  Yes Yes   Sig: Take 500-1,000 mg by mouth every 6 hours as needed for mild pain   ibuprofen (ADVIL,MOTRIN) 800 MG tablet 12/13/2018 at 2300  No Yes   Sig: Take 1 tablet (800 mg) by mouth every 6 hours as needed for pain   tiZANidine (ZANAFLEX) 4 MG capsule Past Week at Unknown time  Yes Yes   Sig: Take 4 mg by mouth At Bedtime      Facility-Administered Medications: None     Allergies   Allergies   Allergen Reactions     Tdap [Daptacel] Other (See Comments)     Tachycardia, fever, local reaction, chills     Tdap [Daptacel]        Physical Exam   Vital Signs: Temp: 100.2  F (37.9  C) Temp src: Oral BP: 114/60 Pulse: 143   Resp: 28 SpO2: 98 % O2 Device: None (Room air)    Weight: 206 lbs 4.8 oz    Constitutional: awake, alert, cooperative, no apparent distress, and appears stated age  Eyes: Lids and lashes normal, pupils equal, round and reactive to light, extra ocular muscles intact, sclera clear, conjunctiva normal  ENT: Normocephalic, without obvious  abnormality, atraumatic, sinuses nontender on palpation, external ears without lesions, oral pharynx with moist mucous membranes, tonsils without erythema or exudates, gums normal and good dentition.  Hematologic / Lymphatic: no cervical lymphadenopathy  Respiratory: No increased work of breathing, good air exchange, clear to auscultation bilaterally, no crackles or wheezing except slight rhonchi at right base  Cardiovascular: Normal apical impulse, regular rate and rhythm, normal S1 and S2, no S3 or S4, and no murmur noted  GI: No scars, normal bowel sounds, soft, non-distended, non-tender, no masses palpated, no hepatosplenomegally  Musculoskeletal: no lower extremity pitting edema present  there is no redness, warmth, or swelling of the joints    Data   Data reviewed today: I reviewed all medications, new labs and imaging results over the last 24 hours. I personally reviewed the chest x-ray image(s) showing right lower infiltrate.    Recent Labs   Lab 12/14/18  1102   WBC 10.8   HGB 13.6   MCV 87         POTASSIUM 3.6   CHLORIDE 102   CO2 25   BUN 10   CR 0.81   ANIONGAP 9   GORDON 8.8   GLC 99   ALBUMIN 3.6   PROTTOTAL 7.6   BILITOTAL 0.6   ALKPHOS 117   ALT 46   AST 34       Recent Results (from the past 24 hour(s))   Chest XR,  PA & LAT    Narrative    CHEST TWO VIEWS 12/14/2018 11:14 AM     HISTORY: Cough.     COMPARISON: None available      Impression    IMPRESSION: Right lung base infiltrate/pneumonia.    CORY MUÑOZ MD

## 2018-12-14 NOTE — PROGRESS NOTES
S-(situation): Patient registered to Observation. Patient arrived to room 271 via cart from ED.    B-(background): [place]    A-(assessment): Pt alert, LS clear/diminished in Rt lower lobe, temps 99.9 oral, pt c/o headache constant, VSS.    R-(recommendations): Orders and observation goals reviewed with pt    Nursing Observation criteria listed below was met:    Skin issues/needs documented:NA  Isolation needs addressed, if appropriate: Yes  Fall Prevention: Education given and documented: Yes  Education Assessment documented:Yes  Education Documented (Pre-existing chronic infection such as, MRSA/VRE need education on admission): Yes  OBS video/handout Reviewed & DocumentedYes  Medication Reconciliation Complete: Yes  New medication patient education completed and documented (Possible Side Effects of Common Medications handout): Yes  Home medications if not able to send immediately home with family stored here: NA  Reminder note placed in discharge instructions: NA  Patient has discharge needs (If yes, please explain): No

## 2018-12-14 NOTE — ED NOTES
ED Nursing criteria listed below was addressed during verbal handoff:     Abnormal vitals: Yes  Abnormal results: Yes  Med Reconciliation completed: Yes  Meds given in ED: Yes  Any Overdue Meds: No  Core Measures: Yes  Isolation: Yes  Special needs: No  Skin assessment: Yes    Observation Patient  Education provided: Yes

## 2018-12-15 VITALS
TEMPERATURE: 98.7 F | RESPIRATION RATE: 20 BRPM | HEART RATE: 92 BPM | OXYGEN SATURATION: 95 % | BODY MASS INDEX: 38.95 KG/M2 | DIASTOLIC BLOOD PRESSURE: 58 MMHG | SYSTOLIC BLOOD PRESSURE: 105 MMHG | WEIGHT: 211.64 LBS | HEIGHT: 62 IN

## 2018-12-15 LAB
ERYTHROCYTE [DISTWIDTH] IN BLOOD BY AUTOMATED COUNT: 12.8 % (ref 10–15)
HCT VFR BLD AUTO: 37.4 % (ref 35–47)
HGB BLD-MCNC: 12.6 G/DL (ref 11.7–15.7)
MCH RBC QN AUTO: 30.5 PG (ref 26.5–33)
MCHC RBC AUTO-ENTMCNC: 33.7 G/DL (ref 31.5–36.5)
MCV RBC AUTO: 91 FL (ref 78–100)
PLATELET # BLD AUTO: 209 10E9/L (ref 150–450)
RBC # BLD AUTO: 4.13 10E12/L (ref 3.8–5.2)
WBC # BLD AUTO: 9.6 10E9/L (ref 4–11)

## 2018-12-15 PROCEDURE — 25000128 H RX IP 250 OP 636: Performed by: FAMILY MEDICINE

## 2018-12-15 PROCEDURE — 99207 ZZC MOONLIGHTING INDICATOR: CPT | Performed by: FAMILY MEDICINE

## 2018-12-15 PROCEDURE — 96376 TX/PRO/DX INJ SAME DRUG ADON: CPT

## 2018-12-15 PROCEDURE — 94640 AIRWAY INHALATION TREATMENT: CPT

## 2018-12-15 PROCEDURE — 25000131 ZZH RX MED GY IP 250 OP 636 PS 637: Performed by: FAMILY MEDICINE

## 2018-12-15 PROCEDURE — G0378 HOSPITAL OBSERVATION PER HR: HCPCS

## 2018-12-15 PROCEDURE — 85027 COMPLETE CBC AUTOMATED: CPT | Performed by: FAMILY MEDICINE

## 2018-12-15 PROCEDURE — 25000125 ZZHC RX 250: Performed by: FAMILY MEDICINE

## 2018-12-15 PROCEDURE — 25000132 ZZH RX MED GY IP 250 OP 250 PS 637: Performed by: FAMILY MEDICINE

## 2018-12-15 PROCEDURE — 25800025 ZZH RX 258: Performed by: FAMILY MEDICINE

## 2018-12-15 PROCEDURE — 99217 ZZC OBSERVATION CARE DISCHARGE: CPT | Performed by: FAMILY MEDICINE

## 2018-12-15 PROCEDURE — 36415 COLL VENOUS BLD VENIPUNCTURE: CPT | Performed by: FAMILY MEDICINE

## 2018-12-15 RX ORDER — PREDNISONE 20 MG/1
40 TABLET ORAL DAILY
Qty: 8 TABLET | Refills: 0 | Status: SHIPPED | OUTPATIENT
Start: 2018-12-16 | End: 2019-04-12

## 2018-12-15 RX ORDER — IBUPROFEN 600 MG/1
600 TABLET, FILM COATED ORAL EVERY 6 HOURS PRN
Qty: 90 TABLET | Refills: 0 | Status: ON HOLD | OUTPATIENT
Start: 2018-12-15 | End: 2019-04-12

## 2018-12-15 RX ORDER — PREDNISONE 20 MG/1
40 TABLET ORAL DAILY
Status: DISCONTINUED | OUTPATIENT
Start: 2018-12-15 | End: 2018-12-15 | Stop reason: HOSPADM

## 2018-12-15 RX ORDER — AZITHROMYCIN 250 MG/1
250 TABLET, FILM COATED ORAL DAILY
Status: DISCONTINUED | OUTPATIENT
Start: 2018-12-15 | End: 2018-12-15 | Stop reason: HOSPADM

## 2018-12-15 RX ORDER — ALBUTEROL SULFATE 0.83 MG/ML
2.5 SOLUTION RESPIRATORY (INHALATION)
Status: DISCONTINUED | OUTPATIENT
Start: 2018-12-15 | End: 2018-12-15 | Stop reason: HOSPADM

## 2018-12-15 RX ORDER — BENZONATATE 200 MG/1
200 CAPSULE ORAL 3 TIMES DAILY PRN
Qty: 30 CAPSULE | Refills: 0 | Status: SHIPPED | OUTPATIENT
Start: 2018-12-15 | End: 2019-04-12

## 2018-12-15 RX ORDER — OXYCODONE HYDROCHLORIDE 5 MG/1
5 TABLET ORAL EVERY 6 HOURS PRN
Qty: 12 TABLET | Refills: 0 | Status: SHIPPED | OUTPATIENT
Start: 2018-12-15 | End: 2019-04-12

## 2018-12-15 RX ORDER — AZITHROMYCIN 250 MG/1
250 TABLET, FILM COATED ORAL DAILY
Qty: 3 TABLET | Refills: 0 | Status: SHIPPED | OUTPATIENT
Start: 2018-12-16 | End: 2018-12-19

## 2018-12-15 RX ORDER — ONDANSETRON 4 MG/1
4 TABLET, ORALLY DISINTEGRATING ORAL EVERY 6 HOURS PRN
Qty: 20 TABLET | Refills: 0 | Status: SHIPPED | OUTPATIENT
Start: 2018-12-15 | End: 2019-06-28

## 2018-12-15 RX ORDER — DOCUSATE SODIUM 100 MG/1
100 CAPSULE, LIQUID FILLED ORAL 2 TIMES DAILY
Qty: 20 CAPSULE | Refills: 0 | Status: SHIPPED | OUTPATIENT
Start: 2018-12-15 | End: 2019-04-12

## 2018-12-15 RX ADMIN — LIDOCAINE HYDROCHLORIDE 30 ML: 20 SOLUTION ORAL; TOPICAL at 12:08

## 2018-12-15 RX ADMIN — DOCUSATE SODIUM 100 MG: 100 CAPSULE, LIQUID FILLED ORAL at 10:42

## 2018-12-15 RX ADMIN — ACETAMINOPHEN 1000 MG: 500 TABLET ORAL at 09:25

## 2018-12-15 RX ADMIN — OXYCODONE HYDROCHLORIDE 10 MG: 5 TABLET ORAL at 12:53

## 2018-12-15 RX ADMIN — IBUPROFEN 600 MG: 600 TABLET ORAL at 12:53

## 2018-12-15 RX ADMIN — ALBUTEROL SULFATE 2.5 MG: 2.5 SOLUTION RESPIRATORY (INHALATION) at 10:09

## 2018-12-15 RX ADMIN — POTASSIUM CHLORIDE, DEXTROSE MONOHYDRATE AND SODIUM CHLORIDE: 150; 5; 450 INJECTION, SOLUTION INTRAVENOUS at 02:23

## 2018-12-15 RX ADMIN — OXYCODONE HYDROCHLORIDE 10 MG: 5 TABLET ORAL at 09:25

## 2018-12-15 RX ADMIN — PREDNISONE 40 MG: 20 TABLET ORAL at 10:41

## 2018-12-15 RX ADMIN — OXYCODONE HYDROCHLORIDE 10 MG: 5 TABLET ORAL at 00:47

## 2018-12-15 RX ADMIN — POTASSIUM CHLORIDE, DEXTROSE MONOHYDRATE AND SODIUM CHLORIDE: 150; 5; 450 INJECTION, SOLUTION INTRAVENOUS at 12:55

## 2018-12-15 RX ADMIN — OXYCODONE HYDROCHLORIDE 10 MG: 5 TABLET ORAL at 06:18

## 2018-12-15 RX ADMIN — MORPHINE SULFATE 2 MG: 2 INJECTION, SOLUTION INTRAMUSCULAR; INTRAVENOUS at 04:57

## 2018-12-15 RX ADMIN — RANITIDINE 150 MG: 150 TABLET ORAL at 12:08

## 2018-12-15 RX ADMIN — ONDANSETRON 4 MG: 4 TABLET, ORALLY DISINTEGRATING ORAL at 06:18

## 2018-12-15 RX ADMIN — AZITHROMYCIN MONOHYDRATE 250 MG: 250 TABLET ORAL at 12:08

## 2018-12-15 RX ADMIN — IBUPROFEN 600 MG: 600 TABLET ORAL at 06:18

## 2018-12-15 RX ADMIN — ACETAMINOPHEN 1000 MG: 500 TABLET ORAL at 00:47

## 2018-12-15 RX ADMIN — POTASSIUM CHLORIDE, DEXTROSE MONOHYDRATE AND SODIUM CHLORIDE: 150; 5; 450 INJECTION, SOLUTION INTRAVENOUS at 05:00

## 2018-12-15 RX ADMIN — CEFTRIAXONE SODIUM 2 G: 2 INJECTION, POWDER, FOR SOLUTION INTRAMUSCULAR; INTRAVENOUS at 10:40

## 2018-12-15 NOTE — PROGRESS NOTES
S-(situation): Patient discharged to home with     B-(background): Observation goals met     A-(assessment): HR and BP controlled, afebrile patient on RA with lungs clear and diminished, frequent nonproductive cough. Fair diet.     R-(recommendations): Discharge instructions reviewed with patient and spouse. Listed belongings gathered and returned to patient.  Patient Education resolved: Yes  New medications-Pt. Has been educated about reason of use and side effects Yes  Home and hospital acquired medications returned to patient Yes  Medication Bin checked and emptied on discharge Yes

## 2018-12-15 NOTE — PROVIDER NOTIFICATION
Notified MD at 2020 PM regarding changes in vital signs and sepsis flag    Spoke with: Dr Belcher    Orders were obtained.    Comments: patient has continued elevated temp and low blood pressure. She also flagged for sepsis. Blood pressure down to 80/63 with complaints of dizziness when up and temp up to 102.1. Orders received for a fluid bolus, a lactic was drawn per protocol and will give Ibuprofen for fever. Will monitor vitals as ordered.

## 2018-12-15 NOTE — DISCHARGE SUMMARY
[facility]  Hospitalist Discharge Summary       Date of Admission:  12/14/2018  Date of Discharge:  12/15/2018  Discharging Provider: Alcides Win MD, MD      Discharge Diagnoses   Community-acquired pneumonia, suspected viral.  Active Problems:    Community acquired pneumonia of right lower lobe of lung (H) (12/14/2018)    Tachycardia (12/14/2018)    Fever (12/14/2018)    Headache, temporal (12/14/2018)    CAP (community acquired pneumonia) (12/14/2018)    Chronic neck pain (12/14/2018)       Follow-ups Needed After Discharge   Follow-up Appointments     Follow-up and recommended labs and tests       Follow up with primary care provider, Physician No Ref-Primary, within 7 days for hospital follow- up.  Chest X-ray should be  Repeated if still having any symptoms.               Unresulted Labs Ordered in the Past 30 Days of this Admission     No orders found for last 61 day(s).      These results will be followed up by PCP    Hospital Course   Active Problems:    Community acquired pneumonia of right lower lobe of lung (H) (12/14/2018)    Tachycardia (12/14/2018)    Fever (12/14/2018)    Headache, temporal (12/14/2018)    CAP (community acquired pneumonia) (12/14/2018)    Chronic neck pain (12/14/2018)     Patient never needed oxygen during her stay.  She did have fevers, but her white count stayed normal.  She did continue to have some cough.  Cough did not improve much with nebulizer therapy.  I suspect that her pneumonia is viral in etiology, which is why she is not improving very rapidly with antibiotics, but should continue to improve with further treatment and time.  Will DC on Zithromax to cover atypical pneumonia, but I suspect that this is viral and will require supportive therapy.  Will also do a course of prednisone to help with inflammatory component of her symptoms.    Her main complaint was her headache and her chest pain.  The symptoms persisted despite  Tylenol, ibuprofen, and oxycodone.  She reported the chest tightness very minimally improved with albuterol nebulizer treatment.  Pain was worse upon lying down, so a trial of a GI cocktail was done.  This improved her pain the most.  Prednisone did not seem to do a lot for her symptoms.  We will discharge on the same pain regimen she was receiving while inpatient.  We will also give her some Tessalon Perles to see if suppressing her cough improves the pain a bit.  She is to follow-up if any worsening or other clinical concerns.    Portions of this note were completed using Dragon dictation software.  Although reviewed, there may be typographical and other inadvertent errors that remain.       Consultations This Hospital Stay   None    Code Status   Full Code    Time Spent on this Encounter   I, Alcides Win MD, personally saw the patient today and spent greater than 30 minutes discharging this patient.       Alcides Win MD, MD  [facility]  ______________________________________________________________________    Physical Exam   Vital Signs: Temp: 98.7  F (37.1  C) Temp src: Oral BP: 105/58 Pulse: 92   Resp: 20 SpO2: 95 % O2 Device: None (Room air)    Weight: 211 lbs 10.27 oz  Constitutional: Awake, cooperative, ill-appearing, but appropriate.  Hematologic / Lymphatic: no cervical lymphadenopathy  Respiratory: No increased work of breathing, good air exchange, clear to auscultation bilaterally, no crackles or wheezing  Cardiovascular: Normal apical impulse, regular rate and rhythm, normal S1 and S2, no S3 or S4, and no murmur noted  GI: No scars, normal bowel sounds, soft, non-distended, non-tender, no masses palpated, no hepatosplenomegally  Musculoskeletal: no lower extremity pitting edema present  there is no redness, warmth, or swelling of the joints       Primary Care Physician   Physician No Ref-Primary    Discharge Disposition   Discharged to home  Condition  at discharge: Stable    Significant Results and Procedures   Results for orders placed or performed during the hospital encounter of 12/14/18   Chest XR,  PA & LAT    Narrative    CHEST TWO VIEWS 12/14/2018 11:14 AM     HISTORY: Cough.     COMPARISON: None available      Impression    IMPRESSION: Right lung base infiltrate/pneumonia.    CORY MUÑOZ MD       Discharge Orders      Follow-up and recommended labs and tests     Follow up with primary care provider, Physician No Ref-Primary, within 7 days for hospital follow- up.  Chest X-ray should be  Repeated if still having any symptoms.     Activity    Your activity upon discharge: activity as tolerated     Diet    Follow this diet upon discharge: Regular     Discharge Medications   Current Discharge Medication List      START taking these medications    Details   azithromycin (ZITHROMAX) 250 MG tablet Take 1 tablet (250 mg) by mouth daily for 5 days  Qty: 3 tablet, Refills: 0    Associated Diagnoses: Community acquired pneumonia of right lower lobe of lung (H)      benzonatate (TESSALON) 200 MG capsule Take 1 capsule (200 mg) by mouth 3 times daily as needed for cough  Qty: 30 capsule, Refills: 0    Associated Diagnoses: Community acquired pneumonia of right lower lobe of lung (H)      docusate sodium (COLACE) 100 MG capsule Take 1 capsule (100 mg) by mouth 2 times daily for 10 days  Qty: 20 capsule, Refills: 0    Associated Diagnoses: Chronic neck pain      lidocaine VISCOUS (XYLOCAINE) 2 % solution Take 15 mLs by mouth every 6 hours as needed for moderate pain Swish and swallow  Qty: 120 mL, Refills: 1    Associated Diagnoses: Chest pain on breathing; Headache, temporal      ondansetron (ZOFRAN-ODT) 4 MG ODT tab Take 1 tablet (4 mg) by mouth every 6 hours as needed for nausea or vomiting  Qty: 20 tablet, Refills: 0    Associated Diagnoses: Community acquired pneumonia of right lower lobe of lung (H); Headache, temporal      oxyCODONE (ROXICODONE) 5 MG tablet Take  1 tablet (5 mg) by mouth every 6 hours as needed  Qty: 12 tablet, Refills: 0    Associated Diagnoses: Community acquired pneumonia of right lower lobe of lung (H); Chronic neck pain; Headache, temporal      predniSONE (DELTASONE) 20 MG tablet Take 40 mg by mouth daily for 4 days.  Qty: 8 tablet, Refills: 0    Associated Diagnoses: Community acquired pneumonia of right lower lobe of lung (H)      ranitidine (ZANTAC) 150 MG tablet Take 1 tablet (150 mg) by mouth 2 times daily  Qty: 60 tablet, Refills: 0    Associated Diagnoses: Community acquired pneumonia of right lower lobe of lung (H); Chest pain on breathing         CONTINUE these medications which have CHANGED    Details   ibuprofen (ADVIL/MOTRIN) 600 MG tablet Take 1 tablet (600 mg) by mouth every 6 hours as needed for moderate pain  Qty: 90 tablet, Refills: 0    Associated Diagnoses: Chronic neck pain; Fever, unspecified fever cause; Headache, temporal         CONTINUE these medications which have NOT CHANGED    Details   acetaminophen (TYLENOL) 500 MG tablet Take 500-1,000 mg by mouth every 6 hours as needed for mild pain      tiZANidine (ZANAFLEX) 4 MG capsule Take 4 mg by mouth At Bedtime           Allergies   Allergies   Allergen Reactions     Tdap [Daptacel] Other (See Comments)     Tachycardia, fever, local reaction, chills     Tdap [Daptacel]

## 2018-12-15 NOTE — PLAN OF CARE
S. End of shift report    B. Pneumonia    A./58, HR 's, afebrile, RA, Oxycodone given for pain with tylenol and ibuprofen. Lungs diminished,. Patient has good UOP and tolerating regular diet.     R. Will continue to monitor per POC

## 2018-12-15 NOTE — ED PROVIDER NOTES
History     Chief Complaint   Patient presents with     Fever     Cough     HPI   History as per patient    This is a 33-year-old female, basically healthy, presenting with fever and cough.  Patient has had symptoms of cold with cough, runny nose, sore throat for the last week.  Yesterday, she started spiking fevers and she states she had a temp of 104.9.  She has been using ibuprofen and Tylenol.  She complains of a severe headache and chest pain.  She has had pneumonia in the past and her son was diagnosed with pneumonia 3 days ago.  She does not smoke.  She apparently had some asthma symptoms as a child.  Patient has some chills, and has had some posttussive vomiting.  She is still making urine.  No diarrhea.  She feels weak and generally unwell.    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     Priority: Medium      (normal spontaneous vaginal delivery) 2014     Priority: Medium        Past Medical History:    Past Medical History:   Diagnosis Date     Breast disorder      Choriocarcinoma      Seizures (H)        Past Surgical History:    Past Surgical History:   Procedure Laterality Date     C RAD RESEC TONSIL/PILLARS      2006     CERVICAL ARTHOPLASTY/ ARTIFIC DISC, SINGLE       CYSTOSCOPY, BIOPSY BLADDER, COMBINED           D&C      2004,,2009     LUMPECTOMY BREAST      2002       Family History:    Family History   Problem Relation Age of Onset     Cancer Maternal Grandmother         Pancreatic      "Hypertension Paternal Grandfather      Thyroid Disease Paternal Grandfather      Thyroid Disease Paternal Grandmother      Obesity Paternal Grandmother      Arthritis Paternal Grandmother         RA     Kidney Disease Paternal Grandmother        Social History:  Marital Status:  Single [1]  Social History     Tobacco Use     Smoking status: Never Smoker   Substance Use Topics     Alcohol use: No     Drug use: No        Medications:      Current Facility-Administered Medications on File Prior to Encounter:  bupivacaine HCl 0.25 % preservative free injection SOLN   fentaNYL (SUBLIMAZE) 2 mcg/mL, bupivacaine (MARCAINE) 0.125% in  mL EPIDURAL Drip   lidocaine-EPINEPHrine 1.5 %-[id number] injection     Current Outpatient Medications on File Prior to Encounter:  acetaminophen (TYLENOL) 500 MG tablet Take 500-1,000 mg by mouth every 6 hours as needed for mild pain   ibuprofen (ADVIL,MOTRIN) 800 MG tablet Take 1 tablet (800 mg) by mouth every 6 hours as needed for pain   tiZANidine (ZANAFLEX) 4 MG capsule Take 4 mg by mouth At Bedtime       Review of Systems   All other ROS reviewed and are negative or non-contributory except as stated in HPI.     Physical Exam   BP: 147/58  Pulse: 143  Temp: 101.3  F (38.5  C)  Resp: 28  Height: 157.5 cm (5' 2\")  Weight: 93.6 kg (206 lb 4.8 oz)  SpO2: 96 %      Physical Exam   Constitutional: She appears well-developed and well-nourished.   Ill-appearing female lying in the bed   HENT:   Head: Normocephalic.   Right Ear: External ear normal.   Left Ear: External ear normal.   Nose: Nose normal.   Slightly tacky mucous membranes, no posterior pharyngeal abnormalities   Eyes: Conjunctivae and EOM are normal. Pupils are equal, round, and reactive to light.   Neck: Normal range of motion. Neck supple.   Cardiovascular: Regular rhythm, normal heart sounds and intact distal pulses.   Tachycardia   Pulmonary/Chest:   Tachypnea, tight breath sounds, basilar crackles   Abdominal: Soft. "   Musculoskeletal: Normal range of motion. She exhibits no edema.   Neurological: She is alert.   Some shaking/chills   Skin: Skin is dry. No rash noted. She is not diaphoretic.   Febrile   Psychiatric: Her behavior is normal.   Very anxious   Vitals reviewed.      ED Course (with Medical Decision Making)    Pt seen and examined by me.  RN and EPIC notes reviewed.      Patient with fevers, chills, cough, headache, chest pain associated with cough, shortness of breath.  Possible influenza, influenza-like illness, pneumonia, viral versus bacterial, other cause.  Plan IV, fluids, labs, chest x-ray.  Toradol for pain and fever as she just took Tylenol at 830.    Chemistry is normal.  White count is normal.  Lactate is normal.  Chest x-ray shows right basilar infiltrate/pneumonia.  Influenza is negative.    Patient was started on Zithromax and ceftriaxone.  She primarily complains of a headache now.  She was given Dilaudid without relief.  I added Reglan/Benadryl with mild relief.  I discussed options with her.  Because of the extent of her illness and symptoms we are going to have her admitted observation status for further monitoring and management.  She has received Percocet for her headache pain which should also help bring down the fever more.  I spoke with Dr. Win who has accepted her for admission.        Procedures    Results for orders placed or performed during the hospital encounter of 12/14/18 (from the past 24 hour(s))   Comprehensive metabolic panel   Result Value Ref Range    Sodium 136 133 - 144 mmol/L    Potassium 3.6 3.4 - 5.3 mmol/L    Chloride 102 94 - 109 mmol/L    Carbon Dioxide 25 20 - 32 mmol/L    Anion Gap 9 3 - 14 mmol/L    Glucose 99 70 - 99 mg/dL    Urea Nitrogen 10 7 - 30 mg/dL    Creatinine 0.81 0.52 - 1.04 mg/dL    GFR Estimate 81 >60 mL/min/1.7m2    GFR Estimate If Black >90 >60 mL/min/1.7m2    Calcium 8.8 8.5 - 10.1 mg/dL    Bilirubin Total 0.6 0.2 - 1.3 mg/dL    Albumin 3.6 3.4 - 5.0  g/dL    Protein Total 7.6 6.8 - 8.8 g/dL    Alkaline Phosphatase 117 40 - 150 U/L    ALT 46 0 - 50 U/L    AST 34 0 - 45 U/L   CBC with platelets differential   Result Value Ref Range    WBC 10.8 4.0 - 11.0 10e9/L    RBC Count 4.53 3.8 - 5.2 10e12/L    Hemoglobin 13.6 11.7 - 15.7 g/dL    Hematocrit 39.5 35.0 - 47.0 %    MCV 87 78 - 100 fl    MCH 30.0 26.5 - 33.0 pg    MCHC 34.4 31.5 - 36.5 g/dL    RDW 12.4 10.0 - 15.0 %    Platelet Count 265 150 - 450 10e9/L    Diff Method Automated Method     % Neutrophils 82.9 %    % Lymphocytes 9.8 %    % Monocytes 6.5 %    % Eosinophils 0.1 %    % Basophils 0.4 %    % Immature Granulocytes 0.3 %    Nucleated RBCs 0 0 /100    Absolute Neutrophil 8.9 (H) 1.6 - 8.3 10e9/L    Absolute Lymphocytes 1.1 0.8 - 5.3 10e9/L    Absolute Monocytes 0.7 0.0 - 1.3 10e9/L    Absolute Basophils 0.0 0.0 - 0.2 10e9/L    Abs Immature Granulocytes 0.0 0 - 0.4 10e9/L    Absolute Nucleated RBC 0.0    Lactate for Sepsis Protocol   Result Value Ref Range    Lactate for Sepsis Protocol 0.8 0.7 - 2.0 mmol/L   Procalcitonin   Result Value Ref Range    Procalcitonin 0.06 ng/ml   Erythrocyte sedimentation rate auto   Result Value Ref Range    Sed Rate 24 (H) 0 - 20 mm/h   Chest XR,  PA & LAT    Narrative    CHEST TWO VIEWS 12/14/2018 11:14 AM     HISTORY: Cough.     COMPARISON: None available      Impression    IMPRESSION: Right lung base infiltrate/pneumonia.    CORY MUÑOZ MD   Influenza A/B antigen   Result Value Ref Range    Influenza A/B Agn Specimen Nasal     Influenza A Negative NEG^Negative    Influenza B Negative NEG^Negative   Lactic acid level STAT for sepsis protocol   Result Value Ref Range    Lactate for Sepsis Protocol 1.0 0.7 - 2.0 mmol/L       Medications   acetaminophen (TYLENOL) tablet 500-1,000 mg (1,000 mg Oral Given 12/14/18 1850)   tiZANidine (ZANAFLEX) tablet 4 mg (4 mg Oral Given 12/14/18 2029)   acetaminophen (TYLENOL) tablet 650 mg (not administered)   acetaminophen (TYLENOL)  Suppository 650 mg (not administered)   naloxone (NARCAN) injection 0.1-0.4 mg (not administered)   melatonin tablet 1 mg (not administered)   ondansetron (ZOFRAN-ODT) ODT tab 4 mg (not administered)     Or   ondansetron (ZOFRAN) injection 4 mg (not administered)   azithromycin (ZITHROMAX) 250 mg in sodium chloride 0.9 % 250 mL intermittent infusion (not administered)   dextrose 5% and 0.45% NaCl + KCl 20 mEq/L infusion ( Intravenous New Bag 12/14/18 1700)   oxyCODONE (ROXICODONE) tablet 5-10 mg (5 mg Oral Given 12/14/18 1653)   morphine (PF) injection 2 mg (2 mg Intravenous Given 12/14/18 1850)   docusate sodium (COLACE) capsule 100 mg (100 mg Oral Given 12/14/18 2026)   senna-docusate (SENOKOT-S/PERICOLACE) 8.6-50 MG per tablet 1 tablet (not administered)     Or   senna-docusate (SENOKOT-S/PERICOLACE) 8.6-50 MG per tablet 2 tablet (not administered)   ibuprofen (ADVIL/MOTRIN) tablet 600 mg (600 mg Oral Given 12/14/18 2020)   cefTRIAXone (ROCEPHIN) 2 g vial to attach to  ml bag for ADULTS or NS 50 ml bag for PEDS (not administered)   0.9% sodium chloride BOLUS (0 mLs Intravenous Stopped 12/14/18 1238)   ketorolac (TORADOL) injection 30 mg (30 mg Intravenous Given 12/14/18 1130)   0.9% sodium chloride BOLUS (0 mLs Intravenous ED Infusing on Admission/transfer 12/14/18 1531)   azithromycin (ZITHROMAX) 500 mg in sodium chloride 0.9 % 250 mL intermittent infusion (0 mg Intravenous Stopped 12/14/18 1438)   HYDROmorphone (PF) (DILAUDID) injection 0.5 mg (0.5 mg Intravenous Given 12/14/18 1305)   metoclopramide (REGLAN) injection 10 mg (10 mg Intravenous Given 12/14/18 1349)   diphenhydrAMINE (BENADRYL) injection 25 mg (25 mg Intravenous Given 12/14/18 1346)   oxyCODONE-acetaminophen (PERCOCET) 5-325 MG per tablet 2 tablet (2 tablets Oral Given 12/14/18 1408)   lactated ringers BOLUS 1,000 mL (1,000 mLs Intravenous New Bag 12/14/18 2029)       Assessments & Plan      I have reviewed the findings, diagnosis, plan and  need for follow up with the patient.       Medication List      There are no discharge medications for this visit.         Final diagnoses:   Community acquired pneumonia of right lower lobe of lung (H)     Disposition: Patient admitted in stable condition.    Note: Chart documentation done in part with Dragon Voice Recognition software. Although reviewed after completion, some word and grammatical errors may remain.     12/14/2018   [address] SURGICAL     Rosario Salvador MD  12/14/18 3762

## 2018-12-15 NOTE — PROGRESS NOTES
Patient's tempeture went down to 97.7 with PRN tylenol and ibuprofen. Patient diaphoretic, offered patient assistance with washing up, declined.   Patient c/o generalized pain especially in the mid chest region secondary to coughing. Submitted a request for PRN lozenge's and tessalon rimma's. PRN pain medications rotated as ordered. Patient's pain is slowly decreasing per patient. Will cont to monitor.

## 2018-12-15 NOTE — PROGRESS NOTES
"Pt crying and shaking, temp checked 103.0 oral, gave 1000mg PO tylenol, morphine prn given IV, cold compress applied to forehead, blankets removed, fan placed in room per pt request, ice chips given at this time, oxygen sats are 98% on RA, pt voiced \"it hurts to breathe\".  "

## 2018-12-19 ENCOUNTER — HOSPITAL ENCOUNTER (OUTPATIENT)
Dept: GENERAL RADIOLOGY | Facility: CLINIC | Age: 33
Discharge: HOME | End: 2018-12-19
Attending: FAMILY MEDICINE | Admitting: FAMILY MEDICINE
Payer: COMMERCIAL

## 2018-12-19 ENCOUNTER — OFFICE VISIT (OUTPATIENT)
Dept: FAMILY MEDICINE | Facility: CLINIC | Age: 33
End: 2018-12-19
Payer: COMMERCIAL

## 2018-12-19 VITALS
OXYGEN SATURATION: 97 % | WEIGHT: 205.2 LBS | RESPIRATION RATE: 14 BRPM | BODY MASS INDEX: 37.53 KG/M2 | DIASTOLIC BLOOD PRESSURE: 56 MMHG | HEART RATE: 88 BPM | TEMPERATURE: 98 F | SYSTOLIC BLOOD PRESSURE: 112 MMHG

## 2018-12-19 DIAGNOSIS — J18.9 COMMUNITY ACQUIRED PNEUMONIA OF RIGHT LOWER LOBE OF LUNG: Primary | ICD-10-CM

## 2018-12-19 DIAGNOSIS — J18.9 COMMUNITY ACQUIRED PNEUMONIA OF RIGHT LOWER LOBE OF LUNG: ICD-10-CM

## 2018-12-19 PROCEDURE — 71046 X-RAY EXAM CHEST 2 VIEWS: CPT | Mod: TC

## 2018-12-19 PROCEDURE — 99213 OFFICE O/P EST LOW 20 MIN: CPT | Performed by: FAMILY MEDICINE

## 2018-12-19 RX ORDER — CODEINE PHOSPHATE AND GUAIFENESIN 10; 100 MG/5ML; MG/5ML
1-2 SOLUTION ORAL EVERY 6 HOURS PRN
Qty: 240 ML | Refills: 0 | Status: SHIPPED | OUTPATIENT
Start: 2018-12-19 | End: 2019-04-12

## 2018-12-19 RX ORDER — ALBUTEROL SULFATE 90 UG/1
1-2 AEROSOL, METERED RESPIRATORY (INHALATION) EVERY 4 HOURS PRN
Qty: 1 INHALER | Refills: 11 | Status: SHIPPED | OUTPATIENT
Start: 2018-12-19 | End: 2019-04-12

## 2018-12-19 ASSESSMENT — PAIN SCALES - GENERAL: PAINLEVEL: MODERATE PAIN (5)

## 2018-12-19 NOTE — PROGRESS NOTES
SUBJECTIVE:   Patricia Solano is a 33 year old female who presents to clinic today for the following health issues:          Hospital Follow-up Visit:    Hospital/Nursing Home/IP Rehab Facility: [facility]  Date of Admission: 12/14/18  Date of Discharge: 12/15/18  Reason(s) for Admission: Chest Pain            Problems taking medications regularly:  None       Medication changes since discharge: None       Problems adhering to non-medication therapy:  None    Summary of hospitalization:  [facility] discharge summary reviewed  Diagnostic Tests/Treatments reviewed.  Follow up needed: Chest x-ray  Other Healthcare Providers Involved in Patient s Care:         None  Update since discharge: improved.     Post Discharge Medication Reconciliation: discharge medications reconciled, continue medications without change.  Plan of care communicated with patient and family     Coding guidelines for this visit:  Type of Medical   Decision Making Face-to-Face Visit       within 7 Days of discharge Face-to-Face Visit        within 14 days of discharge   Moderate Complexity [id number]   High Complexity [id number]                  Problem list and histories reviewed & adjusted, as indicated.  Additional history: as documented        Reviewed and updated as needed this visit by clinical staff       Reviewed and updated as needed this visit by Provider        SUBJECTIVE:  Patricia  is a 33 year old female who presents for: New patient to our clinic follow-up of her hospitalization for right-sided pneumonia and chest pain.  She is still feeling very weak.  Less coughing still short of breath but less so.  She did not require any oxygen in the hospital although she states she felt like she should have some.  She just finished Zithromax yesterday.  Has some Tessalon for cough she still goes into coughing spells.  Voiced a little bit of frustration in her hospital stay feeling that they did not listen to her needs  as much as she felt.    Past Medical History:   Diagnosis Date     Breast disorder     biopsy     Choriocarcinoma 2004     Seizures (H)     as 3 y/o, none since, unk cause     Past Surgical History:   Procedure Laterality Date     C RAD RESEC TONSIL/PILLARS      2006     CERVICAL ARTHOPLASTY/ ARTIFIC DISC, SINGLE       CYSTOSCOPY, BIOPSY BLADDER, COMBINED      2011     D&C      2004,2007,2009     LUMPECTOMY BREAST      2002     Social History     Tobacco Use     Smoking status: Never Smoker     Smokeless tobacco: Never Used   Substance Use Topics     Alcohol use: No     Current Outpatient Medications   Medication Sig Dispense Refill     acetaminophen (TYLENOL) 500 MG tablet Take 500-1,000 mg by mouth every 6 hours as needed for mild pain       albuterol (PROAIR HFA) 108 (90 Base) MCG/ACT inhaler Inhale 1-2 puffs into the lungs every 4 hours as needed for shortness of breath / dyspnea or wheezing 1 Inhaler 11     benzonatate (TESSALON) 200 MG capsule Take 1 capsule (200 mg) by mouth 3 times daily as needed for cough 30 capsule 0     docusate sodium (COLACE) 100 MG capsule Take 1 capsule (100 mg) by mouth 2 times daily for 10 days 20 capsule 0     guaiFENesin-codeine (ROBITUSSIN AC) 100-10 MG/5ML solution Take 5-10 mLs by mouth every 6 hours as needed 240 mL 0     ibuprofen (ADVIL/MOTRIN) 600 MG tablet Take 1 tablet (600 mg) by mouth every 6 hours as needed for moderate pain 90 tablet 0     lidocaine VISCOUS (XYLOCAINE) 2 % solution Take 15 mLs by mouth every 6 hours as needed for moderate pain Swish and swallow 120 mL 1     ondansetron (ZOFRAN-ODT) 4 MG ODT tab Take 1 tablet (4 mg) by mouth every 6 hours as needed for nausea or vomiting 20 tablet 0     predniSONE (DELTASONE) 20 MG tablet Take 40 mg by mouth daily for 4 days. 8 tablet 0     ranitidine (ZANTAC) 150 MG tablet Take 1 tablet (150 mg) by mouth 2 times daily 60 tablet 0     tiZANidine (ZANAFLEX) 4 MG capsule Take 4 mg by mouth At Bedtime         REVIEW OF  SYSTEMS:   5 point ROS negative except as noted above in HPI, including Gen., Resp, CV, GI &  system review.     OBJECTIVE:  Vitals: /56   Pulse 88   Temp 98  F (36.7  C) (Temporal)   Resp 14   Wt 93.1 kg (205 lb 3.2 oz)   SpO2 97%   BMI 37.53 kg/m    BMI= Body mass index is 37.53 kg/m .  She is alert appears in no distress.  Comfortable.  Vital signs are as noted.  Throat is clear mucous membranes moist.  Neck supple no adenopathy.  Lungs with some diminished breath sounds on the right.  Heart regular rhythm.  Abdomen soft.  Skin clear.  Chest x-ray is repeated today and shows improvement in the infiltrate.    ASSESSMENT:  Right lower lobe pneumonia    PLAN:  I think her chest pain is just from the pneumonia and she still coughing a bit so however to Robitussin with codeine and an albuterol inhaler which she has used that has helped.  Informed her that her Zithromax is still in her system and we follow-up in another week and she was not continuing to improve.  Otherwise her recommended a one-month follow-up for repeat chest x-ray to make sure there is adequate clearing of the infiltrate.        Destin Villegas MD  [facility]

## 2018-12-28 ENCOUNTER — TELEPHONE (OUTPATIENT)
Dept: PHYSICAL THERAPY | Facility: CLINIC | Age: 33
End: 2018-12-28

## 2019-01-04 ENCOUNTER — HOSPITAL ENCOUNTER (OUTPATIENT)
Dept: PHYSICAL THERAPY | Facility: CLINIC | Age: 34
Setting detail: THERAPIES SERIES
End: 2019-01-04
Attending: PHYSICAL MEDICINE & REHABILITATION
Payer: COMMERCIAL

## 2019-01-04 PROCEDURE — 97112 NEUROMUSCULAR REEDUCATION: CPT | Mod: GP | Performed by: PHYSICAL THERAPIST

## 2019-01-04 PROCEDURE — 97140 MANUAL THERAPY 1/> REGIONS: CPT | Mod: GP | Performed by: PHYSICAL THERAPIST

## 2019-01-07 ENCOUNTER — HOSPITAL ENCOUNTER (OUTPATIENT)
Dept: PHYSICAL THERAPY | Facility: CLINIC | Age: 34
Setting detail: THERAPIES SERIES
End: 2019-01-07
Attending: PHYSICAL MEDICINE & REHABILITATION
Payer: COMMERCIAL

## 2019-01-07 PROCEDURE — 97140 MANUAL THERAPY 1/> REGIONS: CPT | Mod: GP | Performed by: PHYSICAL THERAPIST

## 2019-01-07 NOTE — PROGRESS NOTES
"Outpatient Physical Therapy Progress Note     Patient: Patricia Solano  : 1985    Beginning/End Dates of Reporting Period:  2 visits  - 18 and 19    Referring Provider: Georgia Cervantes MD    Therapy Diagnosis: Acute radicular low back pain     Client Self Report: She reports she is improving for back and she would like to work on her neck issues. Low back: now: 3/10, range: 3/10 increasing to 6-7/10. Neck: now: 6/10, range: 5/10 increasing to 9-10/10. Low back increase with standing too long or sitting too long; improved with heat. NEck: increase with everything. Decrease with nothing unless she has a pain greater than her neck. She has been sick so limited PT last few weeks.     Objective Measurements:  Objective Measure: NDI  Details: 78%  Objective Measure: Standing low back AROM   Details: Flexion: 70 degrees at end range/tight muscle; extension 12 degrees at end range iliolumbar ligament area.   Objective Measure: Cervical seated AROM  Details: flexion: 38 degrees painful, shooting; extension: 20 degrees; rotation: R: 20% painful, 10% L and gets a \"zap\"     Outcome Measures (most recent score):  Sandra STarT Sub-Score (Q5-9): 1  Sandra STarT Total Score (all 9): 3       Goals:  Goal Identifier SLeeping   Goal Description Patricia will be able to use sleep hygiene concepts to find a comfortable position allowing her to sleep at her baseline(Horrible at this time. Has been sick, new meds)   Target Date 19   Date Met      Progress:     Goal Identifier Transfers   Goal Description Using good positioning and trunk stabilization, Patricia will be able to roll in bed, move sit<->stand with 50% decrease in symtoms(improved)   Target Date 19   Date Met      Progress:     Goal Identifier Walking and standing   Goal Description Patricia will be able to complete a home program as instructed so she can walk 1 mile and stand at least 20 minutes without increased symptoms   Target Date 19   Date Met    "   Progress:       Progress Toward Goals:   Progress this reporting period: Particia was originally seen for her neck on 9-11-18. She nd then did not show or canceled last 7 scheduled appts. and was discharged from PT at that time. She was seen in PT for assessment on 12-5-18 and did not show for appts x 4 so call was placed. She did not respond to the phone call. She was seen today and stated she and her family had been sick so she missed her appt. She was also hospitalized a few days during this time. She feels her neck is more bothersome to her than her low back. Her Sandra has improved since the last session from medium to low. Her NDI is at 78% today and we discussed this. Her cervical AROM is generally decreased with pain noted. She would like myofascial release as this helped her in the past. Our plan is to continue to work with her neck and low back.  She will be progressed to home exercise program and we will continue with home program.           Plan:  Continue therapy per current plan of care.    Discharge:  No    Thank you for referring Patricia  to [facility] Mariposa Skaggs, PT  [phone]

## 2019-01-07 NOTE — ADDENDUM NOTE
Encounter addended by: Quin Skaggs, PT on: 1/7/2019 8:15 AM   Actions taken: Flowsheet accepted, Sign clinical note

## 2019-01-08 NOTE — ADDENDUM NOTE
Encounter addended by: Quin Skaggs, PT on: 1/7/2019 8:23 PM   Actions taken: Flowsheet data copied forward, Flowsheet accepted

## 2019-01-09 ENCOUNTER — HOSPITAL ENCOUNTER (OUTPATIENT)
Dept: PHYSICAL THERAPY | Facility: CLINIC | Age: 34
Setting detail: THERAPIES SERIES
End: 2019-01-09
Attending: PHYSICAL MEDICINE & REHABILITATION
Payer: COMMERCIAL

## 2019-01-09 PROCEDURE — 97140 MANUAL THERAPY 1/> REGIONS: CPT | Mod: GP | Performed by: PHYSICAL THERAPIST

## 2019-01-09 PROCEDURE — 97112 NEUROMUSCULAR REEDUCATION: CPT | Mod: GP | Performed by: PHYSICAL THERAPIST

## 2019-02-07 ENCOUNTER — TELEPHONE (OUTPATIENT)
Dept: PHYSICAL THERAPY | Facility: CLINIC | Age: 34
End: 2019-02-07

## 2019-03-11 NOTE — DISCHARGE SUMMARY
Outpatient Physical Therapy Discharge Note     Patient: Patricia Solano  : 1985    Beginning/End Dates of Reporting Period:  2 visits 19 and 19 for a total of 4 visits    Referring Provider: Georgia Cervantes MD    Therapy Diagnosis: Acute radicular low back pain     Client Self Report: At the time of her last session, she reported: Very stiff and major headache in the base of neck and head, less so in the temple area and not into the frontal area.     Objective Measurements:  At the time of her last session, she reported:   Objective Measure: Symptoms  Details: Headache 7/10, Low back: 3/10, neck: 7/10. She is reporting Lateral bilateral numbness with worse on the R.      Goals:  Goal Identifier SLeeping   Goal Description Patricia will be able to use sleep hygiene concepts to find a comfortable position allowing her to sleep at her baseline(Horrible at this time. Has been sick, new meds)   Target Date 19   Date Met      Progress:     Goal Identifier Transfers   Goal Description Using good positioning and trunk stabilization, Patricia will be able to roll in bed, move sit<->stand with 50% decrease in symtoms(improved)   Target Date 19   Date Met      Progress:     Goal Identifier Walking and standing   Goal Description Patricia will be able to complete a home program as instructed so she can walk 1 mile and stand at least 20 minutes without increased symptoms   Target Date 19   Date Met      Progress:   Progress Toward Goals:   Not assessed this period.    Plan:  Discharge from therapy.    Discharge:    Reason for Discharge: Patient has failed to schedule further appointments.  Did not keep previous appts    Equipment Issued: None    Discharge Plan: She should continue with activities she was instructed in. Recommend further treatment as able - she had other concerns and this may be why she did not keep appts.     Thank you for referring Patricia  to [facility]  [place]    Quin Skaggs, PT  [phone]

## 2019-03-11 NOTE — ADDENDUM NOTE
Encounter addended by: Quin Skaggs PT on: 3/11/2019 8:12 AM   Actions taken: Sign clinical note, Episode resolved

## 2019-04-11 ENCOUNTER — APPOINTMENT (OUTPATIENT)
Dept: CT IMAGING | Facility: CLINIC | Age: 34
End: 2019-04-11
Attending: PHYSICIAN ASSISTANT
Payer: COMMERCIAL

## 2019-04-11 ENCOUNTER — HOSPITAL ENCOUNTER (OUTPATIENT)
Facility: CLINIC | Age: 34
Setting detail: OBSERVATION
Discharge: ANOTHER HOSPITAL | End: 2019-04-12
Attending: PHYSICIAN ASSISTANT | Admitting: FAMILY MEDICINE
Payer: COMMERCIAL

## 2019-04-11 DIAGNOSIS — R13.10 DYSPHAGIA: ICD-10-CM

## 2019-04-11 DIAGNOSIS — R06.1 STRIDOR: ICD-10-CM

## 2019-04-11 DIAGNOSIS — R13.10 DYSPHAGIA, UNSPECIFIED TYPE: ICD-10-CM

## 2019-04-11 DIAGNOSIS — R06.00 DYSPNEA, UNSPECIFIED TYPE: ICD-10-CM

## 2019-04-11 DIAGNOSIS — D72.829 LEUKOCYTOSIS, UNSPECIFIED TYPE: ICD-10-CM

## 2019-04-11 DIAGNOSIS — R06.00 DYSPNEA: ICD-10-CM

## 2019-04-11 LAB
ANION GAP SERPL CALCULATED.3IONS-SCNC: 8 MMOL/L (ref 3–14)
BASOPHILS # BLD AUTO: 0.1 10E9/L (ref 0–0.2)
BASOPHILS NFR BLD AUTO: 0.4 %
BUN SERPL-MCNC: 13 MG/DL (ref 7–30)
CALCIUM SERPL-MCNC: 9.2 MG/DL (ref 8.5–10.1)
CHLORIDE SERPL-SCNC: 108 MMOL/L (ref 94–109)
CO2 SERPL-SCNC: 25 MMOL/L (ref 20–32)
CREAT SERPL-MCNC: 0.69 MG/DL (ref 0.52–1.04)
CRP SERPL-MCNC: 5.5 MG/L (ref 0–8)
DIFFERENTIAL METHOD BLD: ABNORMAL
EOSINOPHIL NFR BLD AUTO: 1 %
ERYTHROCYTE [DISTWIDTH] IN BLOOD BY AUTOMATED COUNT: 12.1 % (ref 10–15)
GFR SERPL CREATININE-BSD FRML MDRD: >90 ML/MIN/{1.73_M2}
GLUCOSE SERPL-MCNC: 85 MG/DL (ref 70–99)
HCT VFR BLD AUTO: 41.3 % (ref 35–47)
HGB BLD-MCNC: 14 G/DL (ref 11.7–15.7)
IMM GRANULOCYTES # BLD: 0 10E9/L (ref 0–0.4)
IMM GRANULOCYTES NFR BLD: 0.3 %
LYMPHOCYTES # BLD AUTO: 4.5 10E9/L (ref 0.8–5.3)
LYMPHOCYTES NFR BLD AUTO: 37.4 %
MCH RBC QN AUTO: 30.4 PG (ref 26.5–33)
MCHC RBC AUTO-ENTMCNC: 33.9 G/DL (ref 31.5–36.5)
MCV RBC AUTO: 90 FL (ref 78–100)
MONOCYTES # BLD AUTO: 0.7 10E9/L (ref 0–1.3)
MONOCYTES NFR BLD AUTO: 5.8 %
NEUTROPHILS # BLD AUTO: 6.6 10E9/L (ref 1.6–8.3)
NEUTROPHILS NFR BLD AUTO: 55.1 %
NRBC # BLD AUTO: 0 10*3/UL
NRBC BLD AUTO-RTO: 0 /100
PLATELET # BLD AUTO: 379 10E9/L (ref 150–450)
POTASSIUM SERPL-SCNC: 3.8 MMOL/L (ref 3.4–5.3)
RBC # BLD AUTO: 4.61 10E12/L (ref 3.8–5.2)
SODIUM SERPL-SCNC: 141 MMOL/L (ref 133–144)
WBC # BLD AUTO: 12.1 10E9/L (ref 4–11)

## 2019-04-11 PROCEDURE — 86140 C-REACTIVE PROTEIN: CPT | Performed by: PHYSICIAN ASSISTANT

## 2019-04-11 PROCEDURE — 96375 TX/PRO/DX INJ NEW DRUG ADDON: CPT | Performed by: FAMILY MEDICINE

## 2019-04-11 PROCEDURE — 84439 ASSAY OF FREE THYROXINE: CPT | Performed by: PHYSICIAN ASSISTANT

## 2019-04-11 PROCEDURE — 99285 EMERGENCY DEPT VISIT HI MDM: CPT | Mod: 25 | Performed by: FAMILY MEDICINE

## 2019-04-11 PROCEDURE — 25000125 ZZHC RX 250: Performed by: PHYSICIAN ASSISTANT

## 2019-04-11 PROCEDURE — 94640 AIRWAY INHALATION TREATMENT: CPT | Performed by: FAMILY MEDICINE

## 2019-04-11 PROCEDURE — 25000132 ZZH RX MED GY IP 250 OP 250 PS 637: Performed by: PHYSICIAN ASSISTANT

## 2019-04-11 PROCEDURE — 85025 COMPLETE CBC W/AUTO DIFF WBC: CPT | Performed by: PHYSICIAN ASSISTANT

## 2019-04-11 PROCEDURE — 80048 BASIC METABOLIC PNL TOTAL CA: CPT | Performed by: PHYSICIAN ASSISTANT

## 2019-04-11 PROCEDURE — 84480 ASSAY TRIIODOTHYRONINE (T3): CPT | Performed by: FAMILY MEDICINE

## 2019-04-11 PROCEDURE — 96374 THER/PROPH/DIAG INJ IV PUSH: CPT | Mod: 59 | Performed by: FAMILY MEDICINE

## 2019-04-11 PROCEDURE — 25000128 H RX IP 250 OP 636: Performed by: PHYSICIAN ASSISTANT

## 2019-04-11 PROCEDURE — 99285 EMERGENCY DEPT VISIT HI MDM: CPT | Mod: Z6 | Performed by: FAMILY MEDICINE

## 2019-04-11 PROCEDURE — 96376 TX/PRO/DX INJ SAME DRUG ADON: CPT | Performed by: FAMILY MEDICINE

## 2019-04-11 PROCEDURE — 70491 CT SOFT TISSUE NECK W/DYE: CPT

## 2019-04-11 RX ORDER — METHYLPREDNISOLONE SODIUM SUCCINATE 125 MG/2ML
125 INJECTION, POWDER, LYOPHILIZED, FOR SOLUTION INTRAMUSCULAR; INTRAVENOUS ONCE
Status: COMPLETED | OUTPATIENT
Start: 2019-04-11 | End: 2019-04-11

## 2019-04-11 RX ORDER — IPRATROPIUM BROMIDE AND ALBUTEROL SULFATE 2.5; .5 MG/3ML; MG/3ML
3 SOLUTION RESPIRATORY (INHALATION) ONCE
Status: COMPLETED | OUTPATIENT
Start: 2019-04-11 | End: 2019-04-11

## 2019-04-11 RX ORDER — KETOROLAC TROMETHAMINE 30 MG/ML
30 INJECTION, SOLUTION INTRAMUSCULAR; INTRAVENOUS ONCE
Status: COMPLETED | OUTPATIENT
Start: 2019-04-11 | End: 2019-04-11

## 2019-04-11 RX ORDER — CETIRIZINE HYDROCHLORIDE 10 MG/1
10 TABLET ORAL ONCE
Status: COMPLETED | OUTPATIENT
Start: 2019-04-11 | End: 2019-04-11

## 2019-04-11 RX ORDER — DIPHENHYDRAMINE HYDROCHLORIDE 50 MG/ML
25 INJECTION INTRAMUSCULAR; INTRAVENOUS ONCE
Status: COMPLETED | OUTPATIENT
Start: 2019-04-11 | End: 2019-04-11

## 2019-04-11 RX ORDER — HYDROMORPHONE HYDROCHLORIDE 1 MG/ML
0.5 INJECTION, SOLUTION INTRAMUSCULAR; INTRAVENOUS; SUBCUTANEOUS
Status: COMPLETED | OUTPATIENT
Start: 2019-04-11 | End: 2019-04-11

## 2019-04-11 RX ORDER — IOPAMIDOL 755 MG/ML
500 INJECTION, SOLUTION INTRAVASCULAR ONCE
Status: COMPLETED | OUTPATIENT
Start: 2019-04-11 | End: 2019-04-11

## 2019-04-11 RX ADMIN — Medication 0.5 MG: at 21:24

## 2019-04-11 RX ADMIN — KETOROLAC TROMETHAMINE 30 MG: 30 INJECTION, SOLUTION INTRAMUSCULAR; INTRAVENOUS at 20:33

## 2019-04-11 RX ADMIN — Medication 0.5 MG: at 20:33

## 2019-04-11 RX ADMIN — Medication 0.5 MG: at 21:44

## 2019-04-11 RX ADMIN — SODIUM CHLORIDE 69 ML: 9 INJECTION, SOLUTION INTRAVENOUS at 21:12

## 2019-04-11 RX ADMIN — IOPAMIDOL 80 ML: 755 INJECTION, SOLUTION INTRAVENOUS at 21:13

## 2019-04-11 RX ADMIN — LIDOCAINE HYDROCHLORIDE 30 ML: 20 SOLUTION ORAL; TOPICAL at 22:18

## 2019-04-11 RX ADMIN — METHYLPREDNISOLONE SODIUM SUCCINATE 125 MG: 125 INJECTION, POWDER, FOR SOLUTION INTRAMUSCULAR; INTRAVENOUS at 22:32

## 2019-04-11 RX ADMIN — IPRATROPIUM BROMIDE AND ALBUTEROL SULFATE 3 ML: .5; 3 SOLUTION RESPIRATORY (INHALATION) at 23:37

## 2019-04-11 RX ADMIN — CETIRIZINE HYDROCHLORIDE 10 MG: 10 TABLET, FILM COATED ORAL at 22:17

## 2019-04-11 RX ADMIN — DIPHENHYDRAMINE HYDROCHLORIDE 25 MG: 50 INJECTION, SOLUTION INTRAMUSCULAR; INTRAVENOUS at 22:30

## 2019-04-12 ENCOUNTER — APPOINTMENT (OUTPATIENT)
Dept: SPEECH THERAPY | Facility: CLINIC | Age: 34
End: 2019-04-12
Attending: FAMILY MEDICINE
Payer: COMMERCIAL

## 2019-04-12 VITALS
OXYGEN SATURATION: 97 % | SYSTOLIC BLOOD PRESSURE: 116 MMHG | DIASTOLIC BLOOD PRESSURE: 62 MMHG | TEMPERATURE: 97.2 F | RESPIRATION RATE: 18 BRPM

## 2019-04-12 PROBLEM — R06.1 STRIDOR: Status: ACTIVE | Noted: 2019-04-12

## 2019-04-12 PROBLEM — M54.2 CHRONIC NECK PAIN: Status: ACTIVE | Noted: 2018-12-14

## 2019-04-12 PROBLEM — G89.29 CHRONIC NECK PAIN: Status: ACTIVE | Noted: 2018-12-14

## 2019-04-12 LAB
ERYTHROCYTE [SEDIMENTATION RATE] IN BLOOD BY WESTERGREN METHOD: 13 MM/H (ref 0–20)
LACTATE BLD-SCNC: 1.9 MMOL/L (ref 0.7–2)
LACTATE BLD-SCNC: 2.9 MMOL/L (ref 0.7–2)
T3 SERPL-MCNC: 127 NG/DL (ref 60–181)
T4 FREE SERPL-MCNC: 1.05 NG/DL (ref 0.76–1.46)

## 2019-04-12 PROCEDURE — G0378 HOSPITAL OBSERVATION PER HR: HCPCS

## 2019-04-12 PROCEDURE — 25000125 ZZHC RX 250: Performed by: FAMILY MEDICINE

## 2019-04-12 PROCEDURE — 99207 ZZC MOONLIGHTING INDICATOR: CPT | Performed by: FAMILY MEDICINE

## 2019-04-12 PROCEDURE — 83605 ASSAY OF LACTIC ACID: CPT | Performed by: FAMILY MEDICINE

## 2019-04-12 PROCEDURE — 99207 ZZC CDG-CODE CATEGORY CHANGED: CPT | Performed by: FAMILY MEDICINE

## 2019-04-12 PROCEDURE — 25000128 H RX IP 250 OP 636: Performed by: FAMILY MEDICINE

## 2019-04-12 PROCEDURE — 99207 ZZC APP CREDIT; MD BILLING SHARED VISIT: CPT | Mod: 59 | Performed by: FAMILY MEDICINE

## 2019-04-12 PROCEDURE — 99234 HOSP IP/OBS SM DT SF/LOW 45: CPT | Mod: AI | Performed by: FAMILY MEDICINE

## 2019-04-12 PROCEDURE — 96375 TX/PRO/DX INJ NEW DRUG ADDON: CPT

## 2019-04-12 PROCEDURE — 96376 TX/PRO/DX INJ SAME DRUG ADON: CPT

## 2019-04-12 PROCEDURE — 25000132 ZZH RX MED GY IP 250 OP 250 PS 637: Performed by: FAMILY MEDICINE

## 2019-04-12 PROCEDURE — 25800030 ZZH RX IP 258 OP 636: Performed by: FAMILY MEDICINE

## 2019-04-12 PROCEDURE — 96375 TX/PRO/DX INJ NEW DRUG ADDON: CPT | Performed by: FAMILY MEDICINE

## 2019-04-12 PROCEDURE — 85652 RBC SED RATE AUTOMATED: CPT | Performed by: FAMILY MEDICINE

## 2019-04-12 PROCEDURE — 92610 EVALUATE SWALLOWING FUNCTION: CPT | Mod: GN | Performed by: SPEECH-LANGUAGE PATHOLOGIST

## 2019-04-12 PROCEDURE — 25000128 H RX IP 250 OP 636: Performed by: PHYSICIAN ASSISTANT

## 2019-04-12 PROCEDURE — 36415 COLL VENOUS BLD VENIPUNCTURE: CPT | Performed by: FAMILY MEDICINE

## 2019-04-12 RX ORDER — DEXAMETHASONE SODIUM PHOSPHATE 10 MG/ML
10 INJECTION, SOLUTION INTRAMUSCULAR; INTRAVENOUS EVERY 8 HOURS
Status: DISCONTINUED | OUTPATIENT
Start: 2019-04-12 | End: 2019-04-12

## 2019-04-12 RX ORDER — HYDROCODONE BITARTRATE AND ACETAMINOPHEN 5; 325 MG/1; MG/1
1 TABLET ORAL EVERY 4 HOURS PRN
Status: DISCONTINUED | OUTPATIENT
Start: 2019-04-12 | End: 2019-04-12 | Stop reason: ALTCHOICE

## 2019-04-12 RX ORDER — LORAZEPAM 2 MG/ML
1 INJECTION INTRAMUSCULAR ONCE
Status: COMPLETED | OUTPATIENT
Start: 2019-04-12 | End: 2019-04-12

## 2019-04-12 RX ORDER — ONDANSETRON 4 MG/1
4 TABLET, ORALLY DISINTEGRATING ORAL EVERY 6 HOURS PRN
Status: DISCONTINUED | OUTPATIENT
Start: 2019-04-12 | End: 2019-04-12 | Stop reason: HOSPADM

## 2019-04-12 RX ORDER — ACETAMINOPHEN 650 MG/1
650 SUPPOSITORY RECTAL EVERY 4 HOURS PRN
Status: DISCONTINUED | OUTPATIENT
Start: 2019-04-12 | End: 2019-04-12 | Stop reason: HOSPADM

## 2019-04-12 RX ORDER — ACETAMINOPHEN 325 MG/1
650 TABLET ORAL EVERY 4 HOURS PRN
Status: DISCONTINUED | OUTPATIENT
Start: 2019-04-12 | End: 2019-04-12 | Stop reason: HOSPADM

## 2019-04-12 RX ORDER — ONDANSETRON 2 MG/ML
4 INJECTION INTRAMUSCULAR; INTRAVENOUS EVERY 6 HOURS PRN
Status: DISCONTINUED | OUTPATIENT
Start: 2019-04-12 | End: 2019-04-12 | Stop reason: HOSPADM

## 2019-04-12 RX ORDER — OXYCODONE AND ACETAMINOPHEN 5; 325 MG/1; MG/1
1-2 TABLET ORAL
COMMUNITY
Start: 2019-04-03 | End: 2020-04-02

## 2019-04-12 RX ORDER — HYDROCODONE BITARTRATE AND ACETAMINOPHEN 5; 325 MG/1; MG/1
TABLET ORAL
Refills: 0 | Status: ON HOLD | COMMUNITY
Start: 2018-07-27 | End: 2019-04-12

## 2019-04-12 RX ORDER — OXYCODONE AND ACETAMINOPHEN 5; 325 MG/1; MG/1
1 TABLET ORAL EVERY 4 HOURS PRN
Status: DISCONTINUED | OUTPATIENT
Start: 2019-04-12 | End: 2019-04-12 | Stop reason: HOSPADM

## 2019-04-12 RX ORDER — ZOLPIDEM TARTRATE 10 MG/1
10 TABLET ORAL
COMMUNITY

## 2019-04-12 RX ORDER — NALOXONE HYDROCHLORIDE 0.4 MG/ML
.1-.4 INJECTION, SOLUTION INTRAMUSCULAR; INTRAVENOUS; SUBCUTANEOUS
Status: DISCONTINUED | OUTPATIENT
Start: 2019-04-12 | End: 2019-04-12 | Stop reason: HOSPADM

## 2019-04-12 RX ORDER — SODIUM CHLORIDE 9 MG/ML
INJECTION, SOLUTION INTRAVENOUS CONTINUOUS
Status: DISCONTINUED | OUTPATIENT
Start: 2019-04-12 | End: 2019-04-12 | Stop reason: HOSPADM

## 2019-04-12 RX ORDER — KETOROLAC TROMETHAMINE 30 MG/ML
30 INJECTION, SOLUTION INTRAMUSCULAR; INTRAVENOUS EVERY 6 HOURS PRN
Status: DISCONTINUED | OUTPATIENT
Start: 2019-04-12 | End: 2019-04-12 | Stop reason: HOSPADM

## 2019-04-12 RX ORDER — IBUPROFEN 600 MG/1
600 TABLET, FILM COATED ORAL EVERY 6 HOURS PRN
Status: DISCONTINUED | OUTPATIENT
Start: 2019-04-12 | End: 2019-04-12 | Stop reason: HOSPADM

## 2019-04-12 RX ADMIN — FAMOTIDINE 20 MG: 10 INJECTION, SOLUTION INTRAVENOUS at 11:35

## 2019-04-12 RX ADMIN — OXYCODONE HYDROCHLORIDE AND ACETAMINOPHEN 1 TABLET: 5; 325 TABLET ORAL at 03:07

## 2019-04-12 RX ADMIN — SODIUM CHLORIDE 1000 ML: 9 INJECTION, SOLUTION INTRAVENOUS at 04:43

## 2019-04-12 RX ADMIN — OXYCODONE HYDROCHLORIDE AND ACETAMINOPHEN 1 TABLET: 5; 325 TABLET ORAL at 07:53

## 2019-04-12 RX ADMIN — KETOROLAC TROMETHAMINE 30 MG: 30 INJECTION INTRAMUSCULAR; INTRAVENOUS at 09:38

## 2019-04-12 RX ADMIN — OXYCODONE HYDROCHLORIDE AND ACETAMINOPHEN 1 TABLET: 5; 325 TABLET ORAL at 13:18

## 2019-04-12 RX ADMIN — SODIUM CHLORIDE 1000 ML: 9 INJECTION, SOLUTION INTRAVENOUS at 03:32

## 2019-04-12 RX ADMIN — SODIUM CHLORIDE: 9 INJECTION, SOLUTION INTRAVENOUS at 05:50

## 2019-04-12 RX ADMIN — LIDOCAINE HYDROCHLORIDE 30 ML: 20 SOLUTION ORAL; TOPICAL at 11:52

## 2019-04-12 RX ADMIN — IBUPROFEN 600 MG: 600 TABLET ORAL at 09:37

## 2019-04-12 RX ADMIN — LORAZEPAM 1 MG: 2 INJECTION INTRAMUSCULAR; INTRAVENOUS at 01:06

## 2019-04-12 ASSESSMENT — PAIN DESCRIPTION - DESCRIPTORS: DESCRIPTORS: CONSTANT;NAGGING

## 2019-04-12 NOTE — DISCHARGE SUMMARY
[facility]  Hospitalist Discharge Summary       Date of Admission:  4/11/2019  Date of Discharge:  4/12/2019  Discharging Provider: Alcides Win MD, MD      Discharge Diagnoses   Odynophagia, acute exacerbation of chronic neck pain, thyroid pain, and minimal stridor      Unresulted Labs Ordered in the Past 30 Days of this Admission     Date and Time Order Name Status Description    4/12/2019 0925 T3 total In process       These results will be followed up by receiving facility    Discharge Disposition   Transferred to [facility]  Condition at discharge: Stable    Hospital Course   Principal Problem:    Stridor (4/12/2019)  Active Problems:    Chronic neck pain (12/14/2018)    Odynophagia    This is a 33-year-old patient with a history of chronic neck pain status post disc replacement surgery approximately 2 years ago.  She had continued chronic neck pain after the surgery and required a steroid injection in her neck approximately 9 days ago.  She reported worsening of her pain after the injection.  3 days ago, she had increasing neck discomfort and pain with swallowing.  She was seen by her PCP and felt to have thyroid related pain.  Thyroid labs were normal.  As she continued to worsen, she presented to the ER in [place] last night.  CT scan showed no acute abnormalities of her neck.  They felt she had some mild stridor on exam, and tried to arrange for transportation to another facility where she can get a nasopharyngeal laryngoscopy  performed.  Unfortunately, no ambulances were traveling due to the weather.  Patient was given IV steroids and pain medications and admitted for observation in our facility.    Overnight, she had no improvement of her symptoms.  Bedside swallow evaluation by speech therapy was normal other than pain.  She reported no improvement with her usual pressure cassette or NSAIDs.  She had no improvement with IV steroids.  There was some  midline chest discomfort with swallowing as well.  Because of this, she was given a dose of H2 blockade.  She did not appreciate any improvement with this either.  She did have improvement of her symptoms with oral administration of viscous lidocaine.  Further history obtained showed a history of thrush approximately 5 months ago.  This was associated with previous steroid administration.  Her vital signs were normal overnight, and there were no lab or imaging abnormalities that showed in the area of concern.  Specifically, her thyroid tests and sed rate were normal.    I discussed the case with ENT.  They recommended if continued symptoms of stridor to refer out for endoscopy.  Our ENT was not available this week.  I contacted the [facility], but they have no beds available.  I then attempted to get her transferred to [facility], but there was a misunderstanding when I contacted the hospital line and I was actually connected with [facility][note 3].  I spoke with ENT there, and they felt that she should go to the ER for an evaluation and nasopharyngeal laryngoscopy.  If she was felt to need inpatient care at that time, they would be happy to consult.  I spoke with Dr. Jamison at [facility] and he accepted the patient in transfer.    Clinically, I suspect that this is either candidal esophagitis, GERD, or chronic nerve irritation from her previous surgery that is contributing to her worsening symptoms.  I did briefly discuss the potential use of gabapentin or Cymbalta or Lyrica or amitriptyline to help with her symptoms.  Patient was not very inclined to pursue these.  It sounds like she has been on gabapentin and Cymbalta in the past.  But it did sound like these were both used primarily before her surgery and certainly were used prior to her most recent symptoms.  These would certainly be an option in trying to manage her symptoms if continued workup is reassuring otherwise.     Portions  of this note were completed using Dragon dictation software.  Although reviewed, there may be typographical and other inadvertent errors that remain.         Consultations This Hospital Stay   SWALLOW EVAL SPEECH PATH AT BEDSIDE IP CONSULT    Code Status   Full Code    Time Spent on this Encounter   I, Alcides Win MD, personally saw the patient today and spent greater than 30 minutes discharging this patient.       Alcides Win MD, MD  [facility]  ______________________________________________________________________    Physical Exam   Vital Signs: Temp: 97.2  F (36.2  C) Temp src: Oral BP: 116/62   Heart Rate: 101 Resp: 18 SpO2: 97 % O2 Device: None (Room air)    Weight: 0 lbs 0 oz  Constitutional: awake, alert, cooperative, no apparent distress, and appears stated age  ENT: Tenderness near her thyroid.  No tenderness along both sides of her neck.  Oropharynx appears normal.  External structures all appear normal.  Minimal stridor when auscultating her neck during breathing.  Respiratory: No increased work of breathing, good air exchange, clear to auscultation bilaterally, no crackles or wheezing  Cardiovascular: Normal apical impulse, regular rate and rhythm, normal S1 and S2, no S3 or S4, and no murmur noted  GI: No scars, normal bowel sounds, soft, non-distended, non-tender, no masses palpated, no hepatosplenomegally  Skin: no bruising or bleeding, normal skin color, texture, turgor and no redness, warmth, or swelling  Musculoskeletal: no lower extremity pitting edema present  there is no redness, warmth, or swelling of the joints  Neurologic: Awake, alert, oriented to name, place and time.  Cranial nerves II-XII are grossly intact.  Motor is 5 out of 5 bilaterally.  Cerebellar finger to nose, heel to shin intact.  Sensory is intact.  Babinski down going, Romberg negative, and gait is normal.       Primary Care Physician   Physician No Ref-Primary    Discharge  Orders   No discharge procedures on file.    Significant Results and Procedures   Results for orders placed or performed during the hospital encounter of 04/11/19   CT Soft Tissue Neck w Contrast    Narrative    CT SCAN OF THE NECK WITH CONTRAST  4/11/2019 9:28 PM     HISTORY: Right-sided laryngeal pain, difficulty swallowing, stridor,  C5-C6 steroid epidural 8 days prior.    TECHNIQUE: Axial CT images of the neck following administration of  intravenous contrast with reformations. Radiation dose for this scan  was reduced using automated exposure control, adjustment of the mA  and/or kV according to patient size, or iterative reconstruction  technique. 80mL, Isovue-370 IV.     COMPARISON: None.     FINDINGS:   Visualized sinuses, nasopharynx and orbits: Normal.     Tongue, oral cavity and oropharynx:  Normal.     Hypopharynx: Normal.     Larynx and trachea: Normal.     Thyroid: Normal.     Submandibular glands: Normal.     Parotid glands: Normal.       Lymph nodes: Cervical chain lymph nodes are prominent in size and  number but they appear to have normal fatty kathia.     Vasculature: Normal.     Upper mediastinum and lungs: Normal     Bones: There are postoperative changes of intervertebral disc spacer  at C4-C5. No destructive osseous lesions.       Impression    IMPRESSION:    1. No abnormal mass, fluid collection, or inflammation appreciated  within the neck.  2. Postoperative changes of intervertebral disc spacer at C4-C5. No  destructive osseous lesions.  3. Cervical lymph nodes are moderately increased in both size and  number. This is nonspecific, but may be reactive.      BLANQUITA EID MD       Discharge Medications   Current Discharge Medication List      CONTINUE these medications which have NOT CHANGED    Details   acetaminophen (TYLENOL) 500 MG tablet Take 500-1,000 mg by mouth every 6 hours as needed for mild pain      diphenhydrAMINE HCl (BENADRYL PO) Take 25 mg by mouth      oxyCODONE-acetaminophen  (PERCOCET) 5-325 MG tablet Take 1-2 tablets by mouth      zolpidem (AMBIEN) 10 MG tablet Take 10 mg by mouth nightly as needed for sleep      ondansetron (ZOFRAN-ODT) 4 MG ODT tab Take 1 tablet (4 mg) by mouth every 6 hours as needed for nausea or vomiting  Qty: 20 tablet, Refills: 0    Associated Diagnoses: Community acquired pneumonia of right lower lobe of lung (H); Headache, temporal      tiZANidine (ZANAFLEX) 4 MG capsule Take 4 mg by mouth At Bedtime         STOP taking these medications       ibuprofen (ADVIL/MOTRIN) 600 MG tablet Comments:   Reason for Stopping:             Allergies   Allergies   Allergen Reactions     Tdap [Daptacel] Other (See Comments)     Tachycardia, fever, local reaction, chills

## 2019-04-12 NOTE — PROGRESS NOTES
"   04/12/19 1200   General Information   Onset Date 04/11/19   Start of Care Date 04/12/19   Referring Physician Alcides Win MD   Patient Profile Review/OT: Additional Occupational Profile Info See Profile for full history and prior level of function   Patient/Family Goals Statement \"I don't want my throat to hurt when I swallow\"   Swallowing Evaluation Bedside swallow evaluation   Behaviorial Observations WFL (within functional limits)   Mode of current nutrition Oral diet   Type of oral diet Regular;Thin liquid   Respiratory Status Room air   Clinical Swallow Evaluation   Oral Musculature generally intact   Structural Abnormalities none present   Mucosal Quality good   Mandibular Strength and Mobility intact   Oral Labial Strength and Mobility WFL   Lingual Strength and Mobility WFL   Velar Elevation intact   Buccal Strength and Mobility intact   Laryngeal Function Dry swallow palpated;Voicing initiated   Additional Documentation Yes   Additional evaluation(s) completed today No   Swallow Eval   Feeding Assistance no assistance needed   Clinical Swallow Eval: Thin Liquid Texture Trial   Mode of Presentation, Thin Liquids cup;straw;self-fed   Volume of Liquid or Food Presented 3 ounces   Oral Phase of Swallow WFL   Pharyngeal Phase of Swallow intact  (no overt s/sx of penetration/aspiration)   Diagnostic Statement Swallow functional for thin liquids.   Clinical Swallow Eval: Puree Solid Texture Trial   Mode of Presentation, Puree spoon;self-fed   Volume of Puree Presented 2 tablespoons   Oral Phase, Puree WFL   Pharyngeal Phase, Puree intact  (no overt s/sx of penetration/aspiration.)   Diagnostic Statement Swallow functional for puree consistencies.   Clinical Swallow Eval: Semisolid Texture Trial   Mode of Presentation, Semisolid spoon;self-fed   Volume of Semisolid Food Presented 2 tablespoons   Oral Phase, Semisolid WFL   Pharyngeal Phase, Semisolid repeated swallows  (repeated swallows d/t piecemealing. No " overt s/sx of pen/asp)   Diagnostic Statement Swallow functional for semisolid textures.   Clinical Swallow Eval: Solid Food Texture Trial   Mode of Presentation, Solid self-fed   Volume of Solid Food Presented 2 bites   Oral Phase, Solid WFL   Pharyngeal Phase, Solid intact  (no overt s/sx of penetration or aspiration)   Diagnostic Statement Swallow functional for solid textures. However pt reports pain when swallowing.   Swallow Eval: Clinical Impressions   Skilled Criteria for Therapy Intervention No problems identified which require skilled intervention   Functional Assessment Scale (FAS) 6   Dysphagia Outcome Severity Scale (JEANINE) Level 6 - JEANINE   Treatment Diagnosis Functional oropharyngeal swallow   Recommended Feeding/Eating Techniques alternate between small bites and sips of food/liquid;small sips/bites   Anticipated Discharge Disposition home   Risks and Benefits of Treatment have been explained. Yes   Patient, family and/or staff in agreement with Plan of Care Yes   Clinical Impression Comments No oral-pharyngeal dysfunction identified that would benefit from SLP intervention. Patient presents with grossly normal oropharyngeal swallow characterized by adequate mastication and oral transit and timely pharyngeal response. Pt does report pain when swallow and says this is due to the swelling sensation that she has in her neck. She states that she occasionally has chest pain when eating. She denies any history of reflux. There were no overt s/sx of penetration or aspiration noted. Pt appears safe to remain on a regular diet with thin liquids.   Total Evaluation Time   Total Evaluation Time (Minutes) 30     Thank you for this referral!    Rani Roberson MA, CF-SLP  [facility]  [phone]

## 2019-04-12 NOTE — ASSESSMENT & PLAN NOTE
Ongoing for the past several years, not improved after disc replacement surgery at C4-5 done in 2017.  Recently seen by neurosurgery with a an attempted neck injection at C5-6 done 8 days ago, if anything this made the pain worse.  Will need ongoing neurosurgery follow-up.

## 2019-04-12 NOTE — DISCHARGE SUMMARY
Patient discharging to [facility] [place] for ENT consult for dx of throat pain uncontrolled with pain medication. Patient is awake et in bed with both eyes open. Family st bedside. Patient c/o of constant pain to neck, throat et chest, PRN pain meds given with no relief. Patient was able to eat some solid food this afternoon after receiving prn oral lidocaine et alum mag. She states pain at 6/10 at this time prior to transfer, prn pain meds given. Report given to receiving hospital nurse.

## 2019-04-12 NOTE — PLAN OF CARE
Patient admitted for stridor, pain to neck et throat. Patient is alert et oriented x4. C/O pain to neck, prn pain meds given with some relief. Patient has a hard time swallowing solid food, some ease with clear liquids.  She states it does not hurt when touched, it hurts internally. Patient LSCTA throughout, heart sounds noted et regular.  Patient is independent in room.

## 2019-04-12 NOTE — ED NOTES
ED Nursing criteria listed below was addressed during verbal handoff:     Abnormal vitals: Yes  Abnormal results: Yes  Med Reconciliation completed: Yes  Meds given in ED: Yes  Any Overdue Meds: N/A  Core Measures: Yes  Isolation: N/A  Special needs: N/A  Skin assessment: Yes    Observation Patient  Education provided: Yes, pt watched the observation video.      Pt expressing concerns of the type of pain medication ordered for her when upstairs, updated Yamilka GALDAMEZ.  Pt also concerned that she will not be transferred tomorrow to [facility] for further evaluation.  Encouraged pt to speak with providers tomorrow about the plan of care.     Report given to Yamilka GALDAMEZ, pt to room 272.

## 2019-04-12 NOTE — PROVIDER NOTIFICATION
DATE:  4/12/2019   TIME OF RECEIPT FROM LAB:  0305  LAB TEST:  lactic  LAB VALUE:  2.9  RESULTS GIVEN WITH READ-BACK TO (PROVIDER):  Dr Watkins  TIME LAB VALUE REPORTED TO PROVIDER:   0303

## 2019-04-12 NOTE — PROGRESS NOTES
Patricia Solano  Gender: female  : 1985  [id number] [facility] [address]  [phone] (home)     Medical Record: [id number]  Pharmacy:    [facility] - [place], [place] - [address] [facility] - [place], [place] - [address]  Primary Care Provider: No Ref-Primary, Physician    Parent's names are: Data Unavailable (mother) and Data Unavailable (father).      [facility]  2019     Discharge Phone Call:  Discharge to [facility] ED

## 2019-04-12 NOTE — PROGRESS NOTES
S-(situation): Patient registered to Observation. Patient arrived to room 272 via wheel chair from ed    B-(background): Neck and back pain and the sensation of swelling in throat. Hx of recent neck surgery and recent pain neck injection,    A-(assessment): Alert and oriented x4. VSS. Afebrile. LS clear. Denies SOA but complains slight difficulty of swallowing and a squeezing feeling in neck. Swallow liquids effectively.   Patient reported pain in neck and back. Percocet was administered x1 and was effective in managing pain to comfortable levels/to baseline 2/10 at home. Patient has been tolerating clear diet. Drinking adequate amounts of fluids. Patient independent in room. CMS intact in all extremities. Neuro's intact.     No skin issues.    R-(recommendations): Orders and observation goals reviewed with patien    Nursing Observation criteria listed below was met:    Skin issues/needs documented:Yes  Isolation needs addressed, if appropriate: NA  Fall Prevention: Education given and documented: Yes  Education Assessment documented:Yes  Education Documented (Pre-existing chronic infection such as, MRSA/VRE need education on admission): Yes  OBS video/handout Reviewed & DocumentedYes-given in ED  Medication Reconciliation Complete: Yes  New medication patient education completed and documented (Possible Side Effects of Common Medications handout): Yes  Home medications if not able to send immediately home with family stored here: NA  Reminder note placed in discharge instructions: NA  Patient has discharge needs (If yes, please explain): No

## 2019-04-12 NOTE — ED TRIAGE NOTES
"Pt states that she had oral steroids a few years ago and developed thrush down her \"esophagus\" within 24 hours.  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  Pt was instructed to be careful with steroids.   "
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
## 2019-04-12 NOTE — ED TRIAGE NOTES
Pt presents with concerns  .  Pt had an injection a week ago in her neck, steroid.  Pt states that initially it was worse.  Pt states that she started having trouble swallowing and breathing about 4 days ago.  Pt states that she went to the clinic and they sent her here.
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
## 2019-04-12 NOTE — PROVIDER NOTIFICATION
Notified MD at 0300 AM regarding patient request.      Spoke with: Dr Watkins    Orders were obtained.    Comments: patient stated that she is on Percocet at home not Narco and would like the medication changed. medication reconciliation record updated and orders recieved

## 2019-04-12 NOTE — PROGRESS NOTES
[facility]    Sepsis Evaluation Progress Note    Date of Service: 04/12/2019    I was called to see Patricia Solano due to abnormal vital signs triggering the Sepsis SIRS screening alert. She is not known to have an infection.     Physical Exam    Vital Signs:  Temp: 96.7  F (35.9  C) Temp src: Oral BP: 119/60   Heart Rate: 99 Resp: 18 SpO2: 98 % O2 Device: None (Room air)      Lab:  Lactate for Sepsis Protocol   Date Value Ref Range Status   04/12/2019 2.9 (H) 0.7 - 2.0 mmol/L Final     Comment:     Significant value called to and read back by  BELKIS SARGENT BY HR AT 0303         The patient is at baseline mental status.    The rest of their physical exam is significant for mild discomfort on right side of neck.    Assessment and Plan    The SIRS and exam findings are likely due to pain , there is no sign of sepsis at this time.    Disposition: The patient will remain on the current unit. We will continue to monitor this patient closely.    Law Watkins MD

## 2019-04-12 NOTE — PROGRESS NOTES
Discharge Planner SLP   Patient plan for discharge: Home  Current status: Patient was seen for a clinical bedside swallow evaluation. Upon SLP arrival, pt was receiving pain medication from RN for neck/throat pain. Patient was observed during trials of thin liquid, puree, soft, and hard solid consistencies. Patient presents with grossly normal oropharyngeal swallow characterized by adequate mastication and oral transit and timely pharyngeal response. Pt does report pain when swallow and says this is due to the swelling sensation that she has in her neck. She states that she occasionally has chest pain when eating. She denies any history of reflux. There were no overt s/sx of penetration or aspiration noted. Pt appears safe to remain on a regular diet with thin liquids; however, she has requested to be placed on a puree diet d/t it being more comfortable. Prognosis is good for safe PO intake. SLP will follow during LOS to advance diet as level of pain decreases.  Barriers to return to prior living situation: swallowing difficulties, neck pain  Recommendations for discharge: Regular diet with thin liquids and pills whole with thin liquid.   Rationale for recommendations: No overt s/sx of penetration/aspiration noted this date. Should difficulties continue, recommend VFSS on outpatient basis to objectively evaluate swallow function.        Entered by: Rani Roberson 04/12/2019 10:18 AM

## 2019-04-12 NOTE — ED NOTES
Pt states the GI cocktail helped a little.  Pt states that it helped the pain, but feels the pressure is still there.

## 2019-04-12 NOTE — ED PROVIDER NOTES
"  History     Chief Complaint   Patient presents with     Dysphagia     HPI  Patricia Solano is a 33 year old female who presents for evaluation of a sensation of throat swelling, difficulty swallowing, and difficulty breathing.  She states it is all in the right laryngeal area.  She is very frustrated that she has attempted to work with her PCP and also her neurosurgeon regarding this.  She reports that she is not getting any answers.  History is significant for C5/C6 steroid epidural injection 8 days ago with a posterior approach.  She noted increased pain after the injection, and has not had any improvement.  She states \"I have had a pressure in my neck ever since \".  Also had a headache.  3 days ago she abruptly started having trouble swallowing.  It hurts to swallow past the laryngeal area.  She feels more pressure on the right side of her throat.  \"It affects my breathing, and I hear a whistling noise now \".  She does not have a cough or chest pain.  Denies any fever, chills, nausea, or vomiting.  Denies any GERD symptoms.  No acid brash, substernal chest discomfort, or abdominal pain.  She states that she has GERD off and on in the past, and that the symptoms are nothing like that.  Rates the pain 8 on a scale of 10.  Not improving with ibuprofen and Tylenol.  She does have a past history of esophageal thrush from a course of oral steroids in the past, and throws that out as a possibility.  Her PCP ordered an TSH earlier this morning, and she showed me the result of 2.29.          Excerpt from her primary care visit from earlier today:  ASSESSMENT & PLAN  1. Thyroid pain THYROID STIMULATING HORMONE   2. Esophageal dysphagia   3. Chronic neck pain   4. Difficulty breathing     MDM: I am unsure what to make of the dysphagia and difficulty breathing. She does not appear to be in any acute respiratory distress and vital signs are stable. I do not think that this is an emergency but I did stress that if anything " worsens she needs to go to the emergency room. It does seem logical that this is a response to the injection, but it is not consistent with an anaphylaxis type reaction. On evaluation she does have quite a bit of tenderness with palpitation over the trachea and thyroid. I am going to check a TSH for further evaluation. She wonders about pain medication, but does have a pain contract with Dr. Cervantes and is waiting to hear back from her. At this point I unfortunately do not have much to offer her other than perhaps a new referral to a neurosurgeon or orthopedic spine specialist. I do not want to treat he with steroids at this time considering the steroid injection she just had. We also discussed having the dysphagia evaluated further at the [facility]. I did provide her the number.    Raghu seen today for 25 minutes with >50% of time devoted to counseling various aspects of care. Reviewed at this visit the pathophysiology and diagnosis, discussed possible complications, and treatment goals and strategies.    This note was created using speech recognition software and may contain unintended word substitutions.    JES Starr, COLTONP-C        Last neurosurgery consult from 3/11/2019:  Haydee Ortiz PA-C - 2019 12:00 PM CDT      NAME: PRIYANKA SOLANO MR#: [id number]  CSN: [id number]  AUTHENTICATING CLINICIAN: SONAM Alatorre  CONFIRM #: [id number]  LOC: [id number]    CLINIC PROGRESS NOTE    DATE OF VISIT: 2019  : 1985    SUBJECTIVE:  Ms. Solano is a 33-year-old female who underwent a C4-5 cervical arthroplasty on 2017, by Dr. Chi. Since the time of her surgery she has had some improvement in the lessening the electric shock-type symptoms that she was having due to the spinal cord compression from the disk herniation she had; however, she has had persistent neck pain and arm symptoms since the time of the surgery. She did have an EMG, which did not show a  great deal of cervical radiculopathy and only questionable evidence of cervical radiculopathy, but did show bilateral carpal tunnel syndrome, which she had operated, which improved her symptoms somewhat. She was recommended conservative therapies on her last visit in October 2018. She did follow up somewhat with Physical Therapy, but ended up not participating very much in those conservative therapies due to not being able to tolerate the pain. She has not had any injections since surgery. She reports symptoms are worse on the right side, worse in the back of her neck and go down her arms into her forearms.     She also recently met with a doctor at [facility], Dr. Wilson, who talked to her about doing a 2-level fusion and possibly removing the artificial disk.     She is here today for a second opinion.    OBJECTIVE:  VITAL SIGNS: Blood pressure 137/88, pulse 74, respirations 15.   GENERAL: Alert and oriented, in no acute distress.   NEUROLOGIC: Left upper extremity with 5/5 strength throughout. Right upper extremity with give-way weakness in the deltoid due to some pain, and then 5/5 strength in the range of 2+ reflexes at the triceps, biceps, brachioradialis. Babcock sign negative. Some pain with active range of motion of the shoulders bilaterally, a bit worse with internal rotation of the shoulder on the right. No pain to palpation of the neck. Incision is well healed. Lower extremities: Appropriate strength. Reflexes: 2+ patella and ankle. Negative ankle clonus. Negative Babinski.    IMAGING REVIEW:  No new images were obtained today, but her prior imaging, her cervical CT with myelogram, demonstrates excellent decompression and placement of the cervical arthroplasty at C4-5, and unchanged, very mild disk bulge at C5-6, slightly larger on the right than the left.    ASSESSMENT:  Ms. Solano is a 33-year-old female with radiographically successful cervical arthroplasty with symptoms that continue  to be refractory to time and some conservative therapies, worse on the right than left.    PLAN:  We would recommend a right C5-6 transforaminal epidural steroid injection. If this is not helpful, then we would recommend she meet with Pain Management and discuss medial branch block in her neck. We do not recommend any cervical fusion or revision of the arthroplasty, as it is well placed and we do not have a high level of diagnostic certainty as to what is causing her neck pain, but radiographically her surgery appears in excellent alignment and placement without concern for failure.     Dr. Chi met with the patient today, reviewed the above, and is in agreement with the plan. We will follow up with the patient as needed.     A total time of 45 minutes was spent with the patient today.    [facility] [place]:MEDQ C:   D:19 13:15 T: 19 20:45 CONFIRM #: [id number]      Electronically signed by Haydee Ortiz PA-C at 2019 9:13 AM CDT    Back to top of Progress Notes  Haydee Ortiz PA-C - 2019 11:00 AM CDT  This office note has been dictated.       Electronically signed by Haydee Ortiz PA-C at 2019 1:15 PM CDT              Allergies:  Allergies   Allergen Reactions     Tdap [Daptacel] Other (See Comments)     Tachycardia, fever, local reaction, chills     Tdap [Daptacel]        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     Priority: Medium      (normal spontaneous vaginal delivery) 2014     Priority: Medium         Past Medical History:    Past Medical History:   Diagnosis Date     Breast disorder      Choriocarcinoma 2004     Seizures (H)        Past Surgical History:    Past Surgical History:   Procedure Laterality Date     C RAD RESEC TONSIL/PILLARS      2006     CERVICAL ARTHOPLASTY/ ARTIFIC DISC, SINGLE       CYSTOSCOPY, BIOPSY BLADDER, COMBINED      2011     D&C      2004,2007,2009     LUMPECTOMY BREAST      2002       Family History:    Family History   Problem Relation Age of Onset     Cancer Maternal Grandmother         Pancreatic     Hypertension Paternal Grandfather      Thyroid Disease Paternal Grandfather      Thyroid Disease Paternal Grandmother      Obesity Paternal Grandmother      Arthritis Paternal Grandmother         RA     Kidney Disease Paternal Grandmother        Social History:  Marital Status:  Single [1]  Social History     Tobacco Use     Smoking status: Never Smoker     Smokeless tobacco: Never Used   Substance Use Topics     Alcohol use: No     Drug use: No        Medications:      acetaminophen (TYLENOL) 500 MG tablet   albuterol (PROAIR HFA) 108 (90 Base) MCG/ACT inhaler   guaiFENesin-codeine (ROBITUSSIN AC) 100-10 MG/5ML solution   tiZANidine (ZANAFLEX) 4 MG capsule         Review of Systems   All other systems reviewed and are negative.      Physical Exam   BP: (!) 149/109  Heart Rate: 95  Temp: 98.3  F (36.8  C)  Resp: 19  SpO2: 99 %      Physical Exam   Constitutional: She is oriented to person, place, and time. No distress.   Patient holds her chin high in the air to help with her breathing.   HENT:   Head: Normocephalic and atraumatic.   Right Ear: External ear normal.   Left Ear: External ear normal.   Nose: Nose normal.   Mouth/Throat: Oropharynx is clear and moist. No oropharyngeal exudate.   Right side of her neck tender to palpation.  No masses or crepitus palpated.  Thyroid with some tenderness, but no swelling or nodules.  With the stethoscope, I can hear some stridor in the neck.   She does not have any wheezing in her lungs.  Patient is not in any respiratory  distress at this time.  No tachypnea.   Eyes: Pupils are equal, round, and reactive to light. Conjunctivae and EOM are normal. Right eye exhibits no discharge. Left eye exhibits no discharge. No scleral icterus.   Neck: Normal range of motion. Neck supple. No thyromegaly present.   Cardiovascular: Normal rate, regular rhythm and normal heart sounds.   No murmur heard.  Pulmonary/Chest: Effort normal and breath sounds normal. No respiratory distress. She has no wheezes. She has no rales. She exhibits no tenderness.   Abdominal: Soft. Bowel sounds are normal. She exhibits no distension and no mass. There is no tenderness. There is no rebound and no guarding.   Musculoskeletal: Normal range of motion. She exhibits no edema, tenderness or deformity.   Lymphadenopathy:     She has no cervical adenopathy.   Neurological: She is alert and oriented to person, place, and time. No cranial nerve deficit.   Skin: Skin is warm and dry. Capillary refill takes less than 2 seconds. No rash noted. She is not diaphoretic. No erythema.   Psychiatric: She has a normal mood and affect. Her behavior is normal. Thought content normal.   Nursing note and vitals reviewed.      ED Course     I offered to treat her pain here in the ED.  She brought up the concern that she is under a pain contract.  She did not want to violate her contract.  I thought it would be okay for her to get her acute pain treated here in the ED, as she is not going home with a prescription necessarily.  She still is concerned about this.  Ultimately, we did decide that it is okay to treat her pain.   I do not see how this would be a violation of her contract given her acute medical situation.          Procedures               Critical Care time:  none               Results for orders placed or performed during the hospital encounter of 04/11/19 (from the past 24 hour(s))   CBC with  platelets differential   Result Value Ref Range    WBC 12.1 (H) 4.0 - 11.0 10e9/L    RBC Count 4.61 3.8 - 5.2 10e12/L    Hemoglobin 14.0 11.7 - 15.7 g/dL    Hematocrit 41.3 35.0 - 47.0 %    MCV 90 78 - 100 fl    MCH 30.4 26.5 - 33.0 pg    MCHC 33.9 31.5 - 36.5 g/dL    RDW 12.1 10.0 - 15.0 %    Platelet Count 379 150 - 450 10e9/L    Diff Method Automated Method     % Neutrophils 55.1 %    % Lymphocytes 37.4 %    % Monocytes 5.8 %    % Eosinophils 1.0 %    % Basophils 0.4 %    % Immature Granulocytes 0.3 %    Nucleated RBCs 0 0 /100    Absolute Neutrophil 6.6 1.6 - 8.3 10e9/L    Absolute Lymphocytes 4.5 0.8 - 5.3 10e9/L    Absolute Monocytes 0.7 0.0 - 1.3 10e9/L    Absolute Basophils 0.1 0.0 - 0.2 10e9/L    Abs Immature Granulocytes 0.0 0 - 0.4 10e9/L    Absolute Nucleated RBC 0.0    Basic metabolic panel   Result Value Ref Range    Sodium 141 133 - 144 mmol/L    Potassium 3.8 3.4 - 5.3 mmol/L    Chloride 108 94 - 109 mmol/L    Carbon Dioxide 25 20 - 32 mmol/L    Anion Gap 8 3 - 14 mmol/L    Glucose 85 70 - 99 mg/dL    Urea Nitrogen 13 7 - 30 mg/dL    Creatinine 0.69 0.52 - 1.04 mg/dL    GFR Estimate >90 >60 mL/min/[1.73_m2]    GFR Estimate If Black >90 >60 mL/min/[1.73_m2]    Calcium 9.2 8.5 - 10.1 mg/dL   CRP inflammation   Result Value Ref Range    CRP Inflammation 5.5 0.0 - 8.0 mg/L   CT Soft Tissue Neck w Contrast    Narrative    CT SCAN OF THE NECK WITH CONTRAST  4/11/2019 9:28 PM     HISTORY: Right-sided laryngeal pain, difficulty swallowing, stridor,  C5-C6 steroid epidural 8 days prior.    TECHNIQUE: Axial CT images of the neck following administration of  intravenous contrast with reformations. Radiation dose for this scan  was reduced using automated exposure control, adjustment of the mA  and/or kV according to patient size, or iterative reconstruction  technique. 80mL, Isovue-370 IV.     COMPARISON: None.     FINDINGS:   Visualized sinuses, nasopharynx and orbits: Normal.     Tongue, oral cavity and  oropharynx:  Normal.     Hypopharynx: Normal.     Larynx and trachea: Normal.     Thyroid: Normal.     Submandibular glands: Normal.     Parotid glands: Normal.       Lymph nodes: Cervical chain lymph nodes are prominent in size and  number but they appear to have normal fatty kathia.     Vasculature: Normal.     Upper mediastinum and lungs: Normal     Bones: There are postoperative changes of intervertebral disc spacer  at C4-C5. No destructive osseous lesions.       Impression    IMPRESSION:    1. No abnormal mass, fluid collection, or inflammation appreciated  within the neck.  2. Postoperative changes of intervertebral disc spacer at C4-C5. No  destructive osseous lesions.  3. Cervical lymph nodes are moderately increased in both size and  number. This is nonspecific, but may be reactive.      BLANQUITA EID MD       Medications   ketorolac (TORADOL) injection 30 mg (30 mg Intravenous Given 4/11/19 2033)   HYDROmorphone (PF) (DILAUDID) injection 0.5 mg (0.5 mg Intravenous Given 4/11/19 2144)   Saline 100mL Bag (69 mLs Intravenous Given 4/11/19 2112)   iopamidol (ISOVUE-370) solution 500 mL (80 mLs Intravenous Given 4/11/19 2113)   sodium chloride (PF) 0.9% PF flush 3 mL (3 mLs Intravenous Given 4/11/19 2112)   methylPREDNISolone sodium succinate (solu-MEDROL) injection 125 mg (125 mg Intravenous Given 4/11/19 2232)   cetirizine (zyrTEC) tablet 10 mg (10 mg Oral Given 4/11/19 2217)   diphenhydrAMINE (BENADRYL) injection 25 mg (25 mg Intravenous Given 4/11/19 2230)   lidocaine VISCOUS (XYLOCAINE) 2 % 15 mL, alum & mag hydroxide-simethicone (MYLANTA ES/MAALOX  ES) 15 mL GI Cocktail (30 mLs Oral Given 4/11/19 2218)   ipratropium - albuterol 0.5 mg/2.5 mg/3 mL (DUONEB) neb solution 3 mL (3 mLs Nebulization Given 4/11/19 2337)       Assessments & Plan (with Medical Decision Making)     Dysphagia  Dyspnea  Stridor     33 year old female presents for 3 days of right-sided laryngeal throat discomfort associated with trouble  swallowing and deep breathing.  C5/C6 epidural steroid injection performed 8 days ago due to ongoing neck discomfort.  Has had increased pain in the neck with pressure ever since the injection was performed.  Also has had a mild headache.  Pain is currently rated 8 on a scale of 10 and not improving with OTC medication.  Denies any fevers or chills.  Has a history of GERD, but states the symptoms are nothing like that.  No respiratory symptoms such as rhinorrhea, congestion, cough.  On exam blood pressure 149/109, pulse 95, temperature 98.3, and oxygen saturation 99% on room air.  Patient does have some very mild stridor in the laryngeal area with stethoscope exam.  No wheezing in the lungs.  Tenderness of the right side of the neck but no palpable crepitus or masses.  No swelling.  Oral pharyngeal exam completely normal.  I did offer pain management here in the ED, but she was concerned about her pain contract with her specialist.  Please see the full discussion in the ED course notes above regarding this.  Ultimately, I made the decision that it was appropriate to treat her pain in the acute setting for her acute medical condition not related to her chronic neck discomfort.  Laboratory levels were obtained and this displays very mild elevation in her white blood cell count of 12,100.  Normal differential.  Basic metabolic panel completely normal.  CRP 5.5.  CT of the neck soft tissues displays no abnormal mass, fluid collection or inflammation appreciated within the neck.  Cervical lymph nodes are moderately increased in both size and number but described as nonspecific per radiology.  No epiglottis enlargement noted.  We attempted treatment with IV Solu-Medrol, p.o. Zyrtec, IV Benadryl, and even a DuoNeb treatment.  She did not notice any significant improvement.  Her pain was improved with IV Dilaudid.  By the end of her visit, 4 hours later, she still continued to have stridor in her laryngeal area, dyspnea,  and dysphagia.  GI cocktail was given without improvement as well.  It would be nice if we had the ability to perform a nasopharyngeal scope to look for abnormalities.  Unfortunately, we do not.  I was hoping to transfer her to [facility] for overnight observation followed by ENT consult in the morning and possible NP scope.  Unfortunately, we are in the middle of a blizzard.  Therefore, EMS will only transfer patients if code 3.  I discussed this with the patient.  I discussed my concern with her airway symptoms and the fact that I would recommend monitoring overnight to ensure that this does not worsen.  [place]  Therefore, if an airway compromise situation were to occur, it would take quite a while for EMS to arrive in this blizzard weather.  We discussed an observation stay here at this facility to ensure her safety.  She was in agreement with this.  Hopefully the medications will take full effect, and she shows significant improvement and does not have any airway compromise overnight.  If her symptoms persist, then consideration for transfer for ENT evaluation could be worked on once the winter weather has improved.  At this time, we have decided against antibiotic therapy given the lack of fluid collection in the soft tissues of the neck, lack of epiglottitis findings on CT, or any other source of infection identified.  She is afebrile as well.  Her CRP is normal, and the white blood cell count minor elevation could be related to demargination.  I discussed the observation stay with Dr. Watkins, inpatient hospitalist, and he kindly agreed to accept her care.  Dr. Rosas, ED MD, was involved with her care as well.     I have reviewed the nursing notes.    I have reviewed the findings, diagnosis, plan and need for follow up with the patient.          Medication List      ASK your doctor about these  medications    benzonatate 200 MG capsule  Commonly known as:  TESSALON  200 mg, Oral, 3 TIMES DAILY PRN  Ask about: Should I take this medication?     docusate sodium 100 MG capsule  Commonly known as:  COLACE  100 mg, Oral, 2 TIMES DAILY  Ask about: Should I take this medication?     ibuprofen 600 MG tablet  Commonly known as:  ADVIL/MOTRIN  600 mg, Oral, EVERY 6 HOURS PRN  Ask about: Should I take this medication?     lidocaine VISCOUS 2 % solution  Commonly known as:  XYLOCAINE  15 mLs, Mouth/Throat, EVERY 6 HOURS PRN, Swish and swallow  Ask about: Should I take this medication?     ondansetron 4 MG ODT tab  Commonly known as:  ZOFRAN-ODT  4 mg, Oral, EVERY 6 HOURS PRN  Ask about: Should I take this medication?     oxyCODONE 5 MG tablet  Commonly known as:  ROXICODONE  5 mg, Oral, EVERY 6 HOURS PRN  Ask about: Should I take this medication?     predniSONE 20 MG tablet  Commonly known as:  DELTASONE  40 mg, Oral, DAILY  Ask about: Should I take this medication?     ranitidine 150 MG tablet  Commonly known as:  ZANTAC  150 mg, Oral, 2 TIMES DAILY  Ask about: Should I take this medication?            Final diagnoses:   Dysphagia   Dyspnea   Stridor       Disclaimer: This note consists of symbols derived from keyboarding, dictation and/or voice recognition software. As a result, there may be errors in the script that have gone undetected. Please consider this when interpreting information found in this chart.      4/11/2019   Darien Rodriguez PA-C   [facility] EMERGENCY DEPARTMENT     Darien oRdriguez PA-C  04/12/19 0045

## 2019-05-15 ENCOUNTER — NURSE TRIAGE (OUTPATIENT)
Dept: NURSING | Facility: CLINIC | Age: 34
End: 2019-05-15

## 2019-05-16 NOTE — TELEPHONE ENCOUNTER
[facility] called asking question of interaction between Ambien and percent for pt - wife Patricia   RN advised not to take at same time per [employer]     Dionisio Munoz , RN / [facility]    Reason for Disposition    Caller has medication question only, adult not sick, and triager answers question    Protocols used: MEDICATION QUESTION CALL-A-AH

## 2019-06-28 ENCOUNTER — OFFICE VISIT (OUTPATIENT)
Dept: URGENT CARE | Facility: RETAIL CLINIC | Age: 34
End: 2019-06-28
Payer: COMMERCIAL

## 2019-06-28 VITALS — HEART RATE: 76 BPM | TEMPERATURE: 98.5 F | SYSTOLIC BLOOD PRESSURE: 130 MMHG | DIASTOLIC BLOOD PRESSURE: 76 MMHG

## 2019-06-28 DIAGNOSIS — F32.0 CURRENT MILD EPISODE OF MAJOR DEPRESSIVE DISORDER WITHOUT PRIOR EPISODE (H): ICD-10-CM

## 2019-06-28 DIAGNOSIS — J02.9 ACUTE PHARYNGITIS, UNSPECIFIED ETIOLOGY: Primary | ICD-10-CM

## 2019-06-28 LAB — S PYO AG THROAT QL IA.RAPID: NORMAL

## 2019-06-28 PROCEDURE — 99214 OFFICE O/P EST MOD 30 MIN: CPT | Performed by: FAMILY MEDICINE

## 2019-06-28 PROCEDURE — 87880 STREP A ASSAY W/OPTIC: CPT | Performed by: FAMILY MEDICINE

## 2019-06-28 PROCEDURE — 87081 CULTURE SCREEN ONLY: CPT | Performed by: FAMILY MEDICINE

## 2019-06-28 RX ORDER — HYDROXYZINE HYDROCHLORIDE 25 MG/1
25-50 TABLET, FILM COATED ORAL
COMMUNITY
Start: 2019-06-19 | End: 2020-06-18

## 2019-06-28 RX ORDER — FLUOXETINE 10 MG/1
10 CAPSULE ORAL DAILY
Qty: 30 CAPSULE | Refills: 0 | Status: SHIPPED | OUTPATIENT
Start: 2019-06-28

## 2019-06-28 RX ORDER — FLUCONAZOLE 150 MG/1
150 TABLET ORAL DAILY
Qty: 3 TABLET | Refills: 0 | Status: SHIPPED | OUTPATIENT
Start: 2019-06-28 | End: 2019-07-01

## 2019-06-28 NOTE — PROGRESS NOTES
SUBJECTIVE:  Patricia Solano is a 33 year old female with a chief complaint of sore throat.  Onset of symptoms was 3 day(s) ago.    Course of illness: still present and worsening.  Severity moderate  Current and Associated symptoms: sore throat, fatigue and anxiety  Treatment measures tried include Tylenol/Ibuprofen.  Predisposing factors include recent steroid po and hx of thrush after steroids.     Past Medical History:   Diagnosis Date     Breast disorder     biopsy     Choriocarcinoma 2004     Seizures (H)     as 3 y/o, none since, unk cause     Current Outpatient Medications   Medication Sig Dispense Refill     acetaminophen (TYLENOL) 500 MG tablet Take 500-1,000 mg by mouth every 6 hours as needed for mild pain       diphenhydrAMINE HCl (BENADRYL PO) Take 25 mg by mouth       fluconazole (DIFLUCAN) 150 MG tablet Take 1 tablet (150 mg) by mouth daily for 3 days 3 tablet 0     FLUoxetine (PROZAC) 10 MG capsule Take 1 capsule (10 mg) by mouth daily 30 capsule 0     oxyCODONE-acetaminophen (PERCOCET) 5-325 MG tablet Take 1-2 tablets by mouth       tiZANidine (ZANAFLEX) 4 MG capsule Take 4 mg by mouth At Bedtime       hydrOXYzine (ATARAX) 25 MG tablet Take 25-50 mg by mouth       zolpidem (AMBIEN) 10 MG tablet Take 10 mg by mouth nightly as needed for sleep       History   Smoking Status     Never Smoker   Smokeless Tobacco     Never Used       ROS:  PSYCHIATRIC: presently treated for anxiety.  Has had a positive phq9 for depression according to patient.  Cancer survivor, hx of injury preventing her from work.   also injury.    OBJECTIVE:   /76   Pulse 76   Temp 98.5  F (36.9  C) (Oral)   GENERAL APPEARANCE: moderate distress, cooperative and crying  EYES: EOMI,  PERRL, conjunctiva clear  HENT: ear canals and TM's normal.  Thick white/yellow coating on tongue  NECK: supple, non-tender to palpation, no adenopathy noted  RESP: lungs clear to auscultation - no rales, rhonchi or wheezes  CV: regular  rates and rhythm, normal S1 S2, no murmur noted  ABDOMEN:  soft, nontender, no HSM or masses and bowel sounds normal  SKIN: no suspicious lesions or rashes    Rapid Strep test is negative; await throat culture results.    ASSESSMENT:  Thrush  Anxious depression    PLAN: diflucan, start low dose fluoxetine.  Is scheduled to see PCP within the next 3 weeks.    Symptomatic treat with gargles, lozenges, and OTC analgesic as needed.   Follow-up with primary care provider if not improving.

## 2019-06-30 ENCOUNTER — HOSPITAL ENCOUNTER (EMERGENCY)
Facility: CLINIC | Age: 34
Discharge: HOME | End: 2019-06-30
Attending: NURSE PRACTITIONER | Admitting: NURSE PRACTITIONER
Payer: COMMERCIAL

## 2019-06-30 VITALS
OXYGEN SATURATION: 99 % | DIASTOLIC BLOOD PRESSURE: 91 MMHG | SYSTOLIC BLOOD PRESSURE: 147 MMHG | RESPIRATION RATE: 16 BRPM | HEART RATE: 72 BPM | TEMPERATURE: 97.8 F

## 2019-06-30 DIAGNOSIS — B37.0 ORAL THRUSH: ICD-10-CM

## 2019-06-30 LAB
BACTERIA SPEC CULT: NORMAL
SPECIMEN SOURCE: NORMAL

## 2019-06-30 PROCEDURE — 25000125 ZZHC RX 250: Performed by: NURSE PRACTITIONER

## 2019-06-30 PROCEDURE — 99283 EMERGENCY DEPT VISIT LOW MDM: CPT | Mod: Z6 | Performed by: NURSE PRACTITIONER

## 2019-06-30 PROCEDURE — 25000132 ZZH RX MED GY IP 250 OP 250 PS 637: Performed by: NURSE PRACTITIONER

## 2019-06-30 PROCEDURE — 99283 EMERGENCY DEPT VISIT LOW MDM: CPT

## 2019-06-30 RX ORDER — LIDOCAINE HYDROCHLORIDE 20 MG/ML
10 SOLUTION OROPHARYNGEAL ONCE
Status: COMPLETED | OUTPATIENT
Start: 2019-06-30 | End: 2019-06-30

## 2019-06-30 RX ORDER — DIPHENHYDRAMINE HCL 12.5MG/5ML
25 LIQUID (ML) ORAL ONCE
Status: COMPLETED | OUTPATIENT
Start: 2019-06-30 | End: 2019-06-30

## 2019-06-30 RX ADMIN — DIPHENHYDRAMINE HYDROCHLORIDE 25 MG: 25 SOLUTION ORAL at 22:19

## 2019-06-30 RX ADMIN — LIDOCAINE HYDROCHLORIDE 10 ML: 20 SOLUTION ORAL; TOPICAL at 22:19

## 2019-06-30 ASSESSMENT — ENCOUNTER SYMPTOMS
ARTHRALGIAS: 1
SORE THROAT: 1
FEVER: 0
BACK PAIN: 1

## 2019-06-30 NOTE — ED AVS SNAPSHOT
[facility]  [address] [place] [address]  Phone:  [phone]  Fax:  [phone]                                    Patricia Solano   MRN: [id number]    Department:  [facility] Emergency Department   Date of Visit:  6/30/2019           After Visit Summary Signature Page    I have received my discharge instructions, and my questions have been answered. I have discussed any challenges I see with this plan with the nurse or doctor.    ..........................................................................................................................................  Patient/Patient Representative Signature      ..........................................................................................................................................  Patient Representative Print Name and Relationship to Patient    ..................................................               ................................................  Date                                   Time    ..........................................................................................................................................  Reviewed by Signature/Title    ...................................................              ..............................................  Date                                               Time          22EPIC Rev 08/18

## 2019-07-01 NOTE — ED PROVIDER NOTES
History     Chief Complaint   Patient presents with     Mouth/Lip Problem     HPI  Patricia Solano is a 33 year old female who presents with oral thrush post oral prednisone. She was seen 2 days ago and was prescribed diflucan without relief.  She was seen at Urgent Care today and was prescribed nystatin with magic mouth rinse however did not pick it up due to [facility] needing to compound it but should be ready in the morning. She did take a Percocet prior to arrival for the mouth pain which she reports is burning 9/10 pain.     Allergies:  Allergies   Allergen Reactions     Tdap [Daptacel] Other (See Comments)     Tachycardia, fever, local reaction, chills     Vancomycin Hives       Problem List:    Patient Active Problem List    Diagnosis Date Noted     Stridor 2019     Priority: Medium     Community acquired pneumonia of right lower lobe of lung (H) 2018     Priority: Medium     Chronic neck pain 2018     Priority: Medium     Tachycardia 2018     Priority: Medium     Fever 2018     Priority: Medium     Headache, temporal 2018     Priority: Medium     CAP (community acquired pneumonia) 2018     Priority: Medium     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     Priority: Medium      (normal spontaneous vaginal delivery) 2014     Priority: Medium        Past Medical History:    Past Medical History:   Diagnosis Date     Breast disorder      Choriocarcinoma 2004     Seizures (H)        Past Surgical History:    Past Surgical History:   Procedure Laterality Date     C RAD RESEC TONSIL/PILLARS      2006     CERVICAL ARTHOPLASTY/ ARTIFIC DISC, SINGLE       CYSTOSCOPY, BIOPSY BLADDER, COMBINED           D&C      2004,,2009     LUMPECTOMY BREAST      2002       Family History:    Family History   Problem Relation Age of Onset     Cancer Maternal Grandmother         Pancreatic     Hypertension Paternal Grandfather       Thyroid Disease Paternal Grandfather      Thyroid Disease Paternal Grandmother      Obesity Paternal Grandmother      Arthritis Paternal Grandmother         RA     Kidney Disease Paternal Grandmother        Social History:  Marital Status:  Single [1]  Social History     Tobacco Use     Smoking status: Never Smoker     Smokeless tobacco: Never Used   Substance Use Topics     Alcohol use: No     Drug use: No        Medications:      fluconazole (DIFLUCAN) 150 MG tablet   oxyCODONE-acetaminophen (PERCOCET) 5-325 MG tablet   acetaminophen (TYLENOL) 500 MG tablet   diphenhydrAMINE HCl (BENADRYL PO)   FLUoxetine (PROZAC) 10 MG capsule   hydrOXYzine (ATARAX) 25 MG tablet   tiZANidine (ZANAFLEX) 4 MG capsule   zolpidem (AMBIEN) 10 MG tablet         Review of Systems   Constitutional: Negative for fever.   HENT: Positive for mouth sores and sore throat. Negative for dental problem.    Musculoskeletal: Positive for arthralgias and back pain.   Allergic/Immunologic: Negative for immunocompromised state.       Physical Exam   BP: 183/89  Heart Rate: 73  Temp: 97.8  F (36.6  C)  Resp: 20  SpO2: 100 %      Physical Exam   Constitutional: She appears well-developed and well-nourished.   HENT:   Head: Normocephalic and atraumatic.   Mouth/Throat: Oral lesions present. No trismus in the jaw. No uvula swelling.       Skin: Skin is warm and dry.   Psychiatric: She has a normal mood and affect.   Nursing note and vitals reviewed.      ED Course  Asked patient if she would like for me to see if out inpatient pharmacy could fill her prescription and she declined reporting she will get it in the morning.   Discussed with patient to certainly start medication as directed. Will give for pain liquid benadryl with viscous lidocaine she is to gold in her mouth and then swallow to coat throat.   Will then discharge.         Procedures               Critical Care time:  none               No results found for this or any previous visit  (from the past 24 hour(s)).    Medications   lidocaine HCl (XYLOCAINE) 2 % solution 10 mL (has no administration in time range)   diphenhydrAMINE (BENADRYL) solution 25 mg (has no administration in time range)       Assessments & Plan (with Medical Decision Making)  Thrush as a result of steroid use for chronic neck pain: She is to start nystatin/diphenhydrAMINE/lidocaine/maalox 1:1:1:1 oral suspension as previously prescribed this morning tomorrow.        I have reviewed the nursing notes.    I have reviewed the findings, diagnosis, plan and need for follow up with the patient.          Medication List      There are no discharge medications for this visit.         Final diagnoses:   Oral thrush       6/30/2019   [facility] EMERGENCY DEPARTMENT     Shirley Mccarty, JES CNP  06/30/19 1494

## 2019-07-01 NOTE — ED NOTES
Pt encouraged to take ibuprofen and tylenol liquid if needed for pain. Pt told she can take benadryl as needed.

## 2019-07-01 NOTE — ED TRIAGE NOTES
Pt reports being seen on Friday and diagnosed with thrush, states she feels like symptoms are getting worse.

## 2019-07-29 ENCOUNTER — NURSE TRIAGE (OUTPATIENT)
Dept: NURSING | Facility: CLINIC | Age: 34
End: 2019-07-29

## 2019-07-30 NOTE — TELEPHONE ENCOUNTER
Patricia had questions about medication interactions.    She was prescribed diflucan and nystatin for oral thrush.    She normally takes tizanidine to help her sleep.    After taking the medications she read that diflucan taken concurrently with tizanidine can cause heart arrhythmias.    Advised to avoid tizanidine while being treated with diflucan.    She will take benadryl instead.    Micromedex utilized to check for drug interactions.    Sol Le RN  [facility]              Reason for Disposition    [1] Follow-up call to recent contact AND [2] information only call, no triage required    Protocols used: INFORMATION ONLY CALL-A-

## 2019-08-13 ENCOUNTER — APPOINTMENT (OUTPATIENT)
Dept: ULTRASOUND IMAGING | Facility: CLINIC | Age: 34
End: 2019-08-13
Attending: FAMILY MEDICINE
Payer: COMMERCIAL

## 2019-08-13 ENCOUNTER — HOSPITAL ENCOUNTER (EMERGENCY)
Facility: CLINIC | Age: 34
Discharge: HOME | End: 2019-08-14
Attending: FAMILY MEDICINE | Admitting: FAMILY MEDICINE
Payer: COMMERCIAL

## 2019-08-13 DIAGNOSIS — M79.632 PAIN OF LEFT FOREARM: ICD-10-CM

## 2019-08-13 PROCEDURE — 99284 EMERGENCY DEPT VISIT MOD MDM: CPT | Mod: 25 | Performed by: FAMILY MEDICINE

## 2019-08-13 PROCEDURE — 99283 EMERGENCY DEPT VISIT LOW MDM: CPT | Mod: Z6 | Performed by: FAMILY MEDICINE

## 2019-08-13 PROCEDURE — 93971 EXTREMITY STUDY: CPT | Mod: LT

## 2019-08-13 ASSESSMENT — MIFFLIN-ST. JEOR: SCORE: 1551.37

## 2019-08-13 NOTE — ED AVS SNAPSHOT
[facility] Emergency Department  [address] [place] [address]  Phone:  [phone]  Fax:  [phone]                                    Patricia Solano   MRN: [id number]    Department:  [facility] Emergency Department   Date of Visit:  8/13/2019           After Visit Summary Signature Page    I have received my discharge instructions, and my questions have been answered. I have discussed any challenges I see with this plan with the nurse or doctor.    ..........................................................................................................................................  Patient/Patient Representative Signature      ..........................................................................................................................................  Patient Representative Print Name and Relationship to Patient    ..................................................               ................................................  Date                                   Time    ..........................................................................................................................................  Reviewed by Signature/Title    ...................................................              ..............................................  Date                                               Time          22EPIC Rev 08/18

## 2019-08-14 VITALS
OXYGEN SATURATION: 97 % | HEIGHT: 62 IN | BODY MASS INDEX: 36.44 KG/M2 | RESPIRATION RATE: 18 BRPM | WEIGHT: 198 LBS | SYSTOLIC BLOOD PRESSURE: 148 MMHG | TEMPERATURE: 98.7 F | DIASTOLIC BLOOD PRESSURE: 92 MMHG

## 2019-08-14 NOTE — DISCHARGE INSTRUCTIONS
Ice 20 minutes per hour, (20 minutes on, 40 minutes off) at least 4 times a day.  If you develop a rash with tiny fluid-filled blisters, this would be consistent with shingles.  Oftentimes the burning pain starts first and the rash does not show up for a few days.  If that happens, you should be seen within 24-48 hours to get started on antiviral medication.  It is possible that you may have strained some muscles in your forearm while preparing for your upcoming move.  If that is the case, Tylenol/ibuprofen ice and relative rest would be helpful.  It is possible this also could be coming from your neck.  Recheck in clinic if your symptoms worsen or change.  It was a pleasure visiting with both of you this evening.  I hope this settles down quickly for you.  Good luck with your move!    Thank you for choosing [facility]. We appreciate the opportunity to meet your urgent medical needs. Please let us know if we could have done anything to make your stay more satisfying.    After discharge, please closely monitor for any new or worsening symptoms. Return to the Emergency Department if you develop any acute worsening signs or symptoms.    If you had lab work, cultures or imaging studies done during your stay, the final results may still be pending. We will call you if your plan of care needs to change. However, if you are not improving as expected, please follow up with your primary care provider or clinic.     Start any prescription medications that were prescribed to you and take them as directed.     Please see additional handouts that may be pertinent to your condition.

## 2019-08-14 NOTE — ED PROVIDER NOTES
History     Chief Complaint   Patient presents with     Arm Pain     HPI  Patricia Solano is a 33 year old female who resents to the ED with left forearm pain that started suddenly this evening when she was just sitting at a table playing cards.  She feels a tiny BB sized lump in the left forearm and a burning pain between the elbow and wrist.  Also feels like it swollen.  No erythema or rash.  Pain was so intense that it made her nauseous.    She had a right sided medial branch block done but nothing on the left side.  Her IV was placed on the right side as well.  She denies any fevers chills or sweats.  She did have chickenpox as a child.    She took 2 of her Percocet a couple of hours ago that she uses for her neck and right arm pain.    Allergies:  Allergies   Allergen Reactions     Tdap [Daptacel] Other (See Comments)     Tachycardia, fever, local reaction, chills     Vancomycin Hives       Problem List:    Patient Active Problem List    Diagnosis Date Noted     Stridor 2019     Priority: Medium     Community acquired pneumonia of right lower lobe of lung (H) 2018     Priority: Medium     Chronic neck pain 2018     Priority: Medium     Tachycardia 2018     Priority: Medium     Fever 2018     Priority: Medium     Headache, temporal 2018     Priority: Medium     CAP (community acquired pneumonia) 2018     Priority: Medium     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     Priority: Medium      (normal spontaneous vaginal delivery) 2014     Priority: Medium        Past Medical History:    Past Medical History:   Diagnosis Date     Breast disorder      Choriocarcinoma 2004     Seizures (H)        Past Surgical History:    Past Surgical History:   Procedure Laterality Date     C RAD RESEC TONSIL/PILLARS           CERVICAL ARTHOPLASTY/ ARTIFIC DISC, SINGLE       CYSTOSCOPY, BIOPSY BLADDER, COMBINED            "D&C      2004,2007,2009     GYN SURGERY       HYSTERECTOMY  02/2019     LUMPECTOMY BREAST      2002       Family History:    Family History   Problem Relation Age of Onset     Cancer Maternal Grandmother         Pancreatic     Hypertension Paternal Grandfather      Thyroid Disease Paternal Grandfather      Thyroid Disease Paternal Grandmother      Obesity Paternal Grandmother      Arthritis Paternal Grandmother         RA     Kidney Disease Paternal Grandmother        Social History:  Marital Status:  Single [1]  Social History     Tobacco Use     Smoking status: Never Smoker     Smokeless tobacco: Never Used   Substance Use Topics     Alcohol use: No     Drug use: No        Medications:      acetaminophen (TYLENOL) 500 MG tablet   diphenhydrAMINE HCl (BENADRYL PO)   FLUoxetine (PROZAC) 10 MG capsule   hydrOXYzine (ATARAX) 25 MG tablet   oxyCODONE-acetaminophen (PERCOCET) 5-325 MG tablet   tiZANidine (ZANAFLEX) 4 MG capsule   zolpidem (AMBIEN) 10 MG tablet         Review of Systems   All other systems reviewed and are negative.      Physical Exam   BP: (!) 134/100  Heart Rate: 82  Temp: 98.7  F (37.1  C)  Resp: 18  Height: 157.5 cm (5' 2\")  Weight: 89.8 kg (198 lb)  SpO2: 97 %      Physical Exam   Constitutional: She is oriented to person, place, and time. She appears well-developed and well-nourished. She appears distressed (mild).   Neck:       Pulmonary/Chest: Effort normal. No respiratory distress.   Musculoskeletal:        Left forearm: She exhibits tenderness (moderate) and swelling (mild).        Arms:  Neurological: She is alert and oriented to person, place, and time. No cranial nerve deficit.   Skin: Skin is warm and dry. No rash noted. No erythema.   Psychiatric: She has a normal mood and affect.       ED Course  (with Medical Decision Making)    33-year-old female with acute onset of left forearm pain this evening while sitting at table playing cards.  No injury.  She has not noticed any rash.  Describes " the pain as a burning sensation that was so intense initially that it made her nauseous.  Feels a little tiny BB sized lump in the mid forearm over the radius.  On exam he does have sensitivity of the skin over the forearm and about the C6 distribution.  There is no erythema or rash.  Does have pain with flexion and extension of the wrist and fingers.  No tenderness of the hand elbow or shoulder.    Good radial and ulnar pulses are palpable.  Ultrasound of the left upper extremity ordered to rule out DVT but I think that is unlikely.  Asked her to watch for developing a rash of shingles.      Ultrasound was negative for DVT.  Unclear what exactly causing her forearm pain.  We discussed watching for rash to suggest shingles.  They are preparing to move and she is been doing lots of lifting and repetitive type movements.  Possible this is just musculoskeletal in origin.  Conservative management with Tylenol/ibuprofen, ice and relative rest would be helpful.  Also possible this could be coming from her neck as it does follow somewhat of a C6 distribution.  She can follow-up with her regular physician if this does not improve or if it worsens or changes.  She is just happy the ultrasound was negative.  We discussed reportable signs and when to return.  She is comfortable with this plan.             Procedures               Critical Care time:  none               Results for orders placed or performed during the hospital encounter of 08/13/19 (from the past 24 hour(s))   US Upper Extremity Venous Duplex Left    Narrative    US UPPER EXTREMITY VENOUS DUPLEX LEFT 8/13/2019 11:11 PM     HISTORY: Pain, swelling of left forearm.     TECHNIQUE: Venous Doppler US of the upper extremity.?Color flow and  spectral Doppler with waveform analysis performed.    COMPARISON: None.    FINDINGS: Ultrasound of the left upper extremity demonstrates no deep  vein thrombus in the subclavian, axillary or brachial veins. No  thrombus seen in  the cephalic or basilic veins or visualized portions  of internal jugular vein. No thrombus in the visualized portions of  the left radial or ulnar veins.      Impression    IMPRESSION: No deep venous thrombosis identified left upper extremity.       Medications - No data to display    Assessments & Plan     I have reviewed the nursing notes.    I have reviewed the findings, diagnosis, plan and need for follow up with the patient.       New Prescriptions    No medications on file       Final diagnoses:   Pain of left forearm       8/13/2019   [facility] EMERGENCY DEPARTMENT     Travis Rosas MD  08/13/19 0604

## 2019-08-14 NOTE — ED TRIAGE NOTES
Pt presents with concerns of left arm pain.  Pt states that she noticed a few hours ago some discomfort, swelling, and a small lump in the left forearm.

## 2022-10-14 ENCOUNTER — TRANSFERRED RECORDS (OUTPATIENT)
Dept: HEALTH INFORMATION MANAGEMENT | Facility: CLINIC | Age: 37
End: 2022-10-14

## 2022-11-22 ENCOUNTER — MEDICAL CORRESPONDENCE (OUTPATIENT)
Dept: HEALTH INFORMATION MANAGEMENT | Facility: CLINIC | Age: 37
End: 2022-11-22

## 2022-12-14 ENCOUNTER — TRANSFERRED RECORDS (OUTPATIENT)
Dept: HEALTH INFORMATION MANAGEMENT | Facility: CLINIC | Age: 37
End: 2022-12-14

## 2022-12-15 ENCOUNTER — TRANSCRIBE ORDERS (OUTPATIENT)
Dept: OTHER | Age: 37
End: 2022-12-15

## 2022-12-15 DIAGNOSIS — M54.12 CERVICAL RADICULAR PAIN: Primary | ICD-10-CM

## 2022-12-21 ENCOUNTER — TELEPHONE (OUTPATIENT)
Dept: NEUROSURGERY | Facility: CLINIC | Age: 37
End: 2022-12-21

## 2022-12-21 NOTE — TELEPHONE ENCOUNTER
Referred by: Cierra Hale NP   [facility]   [address]   [facility] [address]   Phone: [phone], Fax: [phone]   Please call to schedule your appointment             Order Questions    Question Answer   Preferred Location: [facility]   Scheduling Instructions: Please call to schedule your appointment   My Clinical Question Is: Pt requests 4th opinion. Extensive cervical spine surgeries

## 2022-12-21 NOTE — TELEPHONE ENCOUNTER
Referring notes under media dated 12/15/2022.  [facility] called for multiple images of spine within the last year.  Abi Verde LPN

## 2022-12-22 NOTE — TELEPHONE ENCOUNTER
Many images recvd from [facility] but not the most recent from 12/14/2022. I will call them and have them resend MR cervical and cervical xrays from 12/14/2022.

## 2022-12-27 NOTE — TELEPHONE ENCOUNTER
"I called the referring clinic   Referred by: Cierra Hale NP  [facility]  [address]  [facility] [address]  Phone: [phone], Fax: [phone]  And was transferred to her care coordinator  Gay, I LM that we are not able to provide surgical services for this patient and to call be back at [phone] and I can read her Dr. Chi reasons.    Darla Applebee, LPN [place], Shelley S Severin-Brown, Darla, LPN  Would you be willing to call the referring clinic and tell then that a neurosurgeon has reviewed and that we cannot help this patient surgically?     Mary           Previous Messages     ----- Message -----   From: Chris Joseph MD   Sent: 12/27/2022  10:47 AM CST   To: Mary Donnelly   Subject: RE: Please review                                   She had a disc arthroplasty by Alon in 2019, then anterior cervical fusion in 2020 by Katie, then a revision of the anterior cervical fusion in 2021, then had to have a spinal cord stimulator placed because her symptoms never improved.     IMO surgery should never been done in the first place.  I think her \"original\" neck pain was misdiagnosed and now it is incredibly difficult, if not impossible, to know where her original pain was coming from, now that she has had 3 surgeries.     Not surgical.     "

## 2023-04-08 ENCOUNTER — HOSPITAL ENCOUNTER (EMERGENCY)
Facility: CLINIC | Age: 38
Discharge: HOME | End: 2023-04-09
Attending: FAMILY MEDICINE | Admitting: FAMILY MEDICINE
Payer: COMMERCIAL

## 2023-04-08 DIAGNOSIS — K12.1 HARD PALATE ULCER: ICD-10-CM

## 2023-04-08 DIAGNOSIS — R50.9 FEBRILE ILLNESS, ACUTE: ICD-10-CM

## 2023-04-08 DIAGNOSIS — K59.03 DRUG-INDUCED CONSTIPATION: ICD-10-CM

## 2023-04-08 DIAGNOSIS — K08.9 DENTAL DISORDER: ICD-10-CM

## 2023-04-08 DIAGNOSIS — R10.84 ABDOMINAL PAIN, GENERALIZED: ICD-10-CM

## 2023-04-08 LAB
BASE EXCESS BLDV CALC-SCNC: 3.9 MMOL/L (ref -7.7–1.9)
HCO3 BLDV-SCNC: 28 MMOL/L (ref 21–28)
LACTATE SERPL-SCNC: 1 MMOL/L (ref 0.7–2)
O2/TOTAL GAS SETTING VFR VENT: 21 %
PCO2 BLDV: 41 MM HG (ref 40–50)
PH BLDV: 7.45 [PH] (ref 7.32–7.43)
PO2 BLDV: 25 MM HG (ref 25–47)

## 2023-04-08 PROCEDURE — 93010 ELECTROCARDIOGRAM REPORT: CPT | Performed by: FAMILY MEDICINE

## 2023-04-08 PROCEDURE — 83605 ASSAY OF LACTIC ACID: CPT | Performed by: FAMILY MEDICINE

## 2023-04-08 PROCEDURE — 82803 BLOOD GASES ANY COMBINATION: CPT | Performed by: FAMILY MEDICINE

## 2023-04-08 PROCEDURE — 250N000011 HC RX IP 250 OP 636: Performed by: FAMILY MEDICINE

## 2023-04-08 PROCEDURE — 258N000003 HC RX IP 258 OP 636: Performed by: FAMILY MEDICINE

## 2023-04-08 PROCEDURE — U0005 INFEC AGEN DETEC AMPLI PROBE: HCPCS | Performed by: FAMILY MEDICINE

## 2023-04-08 PROCEDURE — 99285 EMERGENCY DEPT VISIT HI MDM: CPT | Mod: CS,25 | Performed by: FAMILY MEDICINE

## 2023-04-08 PROCEDURE — 96375 TX/PRO/DX INJ NEW DRUG ADDON: CPT | Performed by: FAMILY MEDICINE

## 2023-04-08 PROCEDURE — 85025 COMPLETE CBC W/AUTO DIFF WBC: CPT | Performed by: FAMILY MEDICINE

## 2023-04-08 PROCEDURE — 93005 ELECTROCARDIOGRAM TRACING: CPT | Performed by: FAMILY MEDICINE

## 2023-04-08 PROCEDURE — 83690 ASSAY OF LIPASE: CPT | Performed by: FAMILY MEDICINE

## 2023-04-08 PROCEDURE — 36415 COLL VENOUS BLD VENIPUNCTURE: CPT | Performed by: FAMILY MEDICINE

## 2023-04-08 PROCEDURE — 80053 COMPREHEN METABOLIC PANEL: CPT | Performed by: FAMILY MEDICINE

## 2023-04-08 PROCEDURE — 96365 THER/PROPH/DIAG IV INF INIT: CPT | Mod: 59 | Performed by: FAMILY MEDICINE

## 2023-04-08 PROCEDURE — 87040 BLOOD CULTURE FOR BACTERIA: CPT | Performed by: FAMILY MEDICINE

## 2023-04-08 PROCEDURE — 84484 ASSAY OF TROPONIN QUANT: CPT | Performed by: FAMILY MEDICINE

## 2023-04-08 PROCEDURE — C9803 HOPD COVID-19 SPEC COLLECT: HCPCS | Performed by: FAMILY MEDICINE

## 2023-04-08 PROCEDURE — 99284 EMERGENCY DEPT VISIT MOD MDM: CPT | Mod: CS | Performed by: FAMILY MEDICINE

## 2023-04-08 RX ORDER — ONDANSETRON 2 MG/ML
4 INJECTION INTRAMUSCULAR; INTRAVENOUS EVERY 30 MIN PRN
Status: DISCONTINUED | OUTPATIENT
Start: 2023-04-08 | End: 2023-04-09 | Stop reason: HOSPADM

## 2023-04-08 RX ORDER — SODIUM CHLORIDE 9 MG/ML
INJECTION, SOLUTION INTRAVENOUS CONTINUOUS
Status: DISCONTINUED | OUTPATIENT
Start: 2023-04-09 | End: 2023-04-09 | Stop reason: HOSPADM

## 2023-04-08 RX ORDER — PIPERACILLIN SODIUM, TAZOBACTAM SODIUM 4; .5 G/20ML; G/20ML
4.5 INJECTION, POWDER, LYOPHILIZED, FOR SOLUTION INTRAVENOUS ONCE
Status: COMPLETED | OUTPATIENT
Start: 2023-04-08 | End: 2023-04-09

## 2023-04-08 RX ORDER — KETOROLAC TROMETHAMINE 30 MG/ML
30 INJECTION, SOLUTION INTRAMUSCULAR; INTRAVENOUS ONCE
Status: COMPLETED | OUTPATIENT
Start: 2023-04-08 | End: 2023-04-08

## 2023-04-08 RX ORDER — LORAZEPAM 2 MG/ML
1 INJECTION INTRAMUSCULAR ONCE
Status: COMPLETED | OUTPATIENT
Start: 2023-04-08 | End: 2023-04-08

## 2023-04-08 RX ADMIN — ONDANSETRON 4 MG: 2 INJECTION INTRAMUSCULAR; INTRAVENOUS at 23:40

## 2023-04-08 RX ADMIN — KETOROLAC TROMETHAMINE 30 MG: 30 INJECTION, SOLUTION INTRAMUSCULAR; INTRAVENOUS at 23:40

## 2023-04-08 RX ADMIN — PIPERACILLIN AND TAZOBACTAM 4.5 G: 4; .5 INJECTION, POWDER, FOR SOLUTION INTRAVENOUS at 23:47

## 2023-04-08 RX ADMIN — SODIUM CHLORIDE 1000 ML: 9 INJECTION, SOLUTION INTRAVENOUS at 23:39

## 2023-04-08 RX ADMIN — LORAZEPAM 1 MG: 2 INJECTION INTRAMUSCULAR; INTRAVENOUS at 23:40

## 2023-04-09 ENCOUNTER — APPOINTMENT (OUTPATIENT)
Dept: CT IMAGING | Facility: CLINIC | Age: 38
End: 2023-04-09
Attending: FAMILY MEDICINE
Payer: COMMERCIAL

## 2023-04-09 ENCOUNTER — APPOINTMENT (OUTPATIENT)
Dept: ULTRASOUND IMAGING | Facility: CLINIC | Age: 38
End: 2023-04-09
Attending: FAMILY MEDICINE
Payer: COMMERCIAL

## 2023-04-09 VITALS
WEIGHT: 160 LBS | BODY MASS INDEX: 29.44 KG/M2 | RESPIRATION RATE: 18 BRPM | HEART RATE: 115 BPM | SYSTOLIC BLOOD PRESSURE: 154 MMHG | HEIGHT: 62 IN | OXYGEN SATURATION: 98 % | TEMPERATURE: 100.9 F | DIASTOLIC BLOOD PRESSURE: 86 MMHG

## 2023-04-09 LAB
ALBUMIN SERPL BCG-MCNC: 4.2 G/DL (ref 3.5–5.2)
ALBUMIN UR-MCNC: NEGATIVE MG/DL
ALP SERPL-CCNC: 137 U/L (ref 35–104)
ALT SERPL W P-5'-P-CCNC: 33 U/L (ref 10–35)
ANION GAP SERPL CALCULATED.3IONS-SCNC: 10 MMOL/L (ref 7–15)
APPEARANCE UR: CLEAR
AST SERPL W P-5'-P-CCNC: 36 U/L (ref 10–35)
BASOPHILS # BLD AUTO: 0 10E3/UL (ref 0–0.2)
BASOPHILS NFR BLD AUTO: 0 %
BILIRUB SERPL-MCNC: 0.4 MG/DL
BILIRUB UR QL STRIP: NEGATIVE
BUN SERPL-MCNC: 9.7 MG/DL (ref 6–20)
CALCIUM SERPL-MCNC: 9.4 MG/DL (ref 8.6–10)
CHLORIDE SERPL-SCNC: 98 MMOL/L (ref 98–107)
COLOR UR AUTO: ABNORMAL
CREAT SERPL-MCNC: 0.81 MG/DL (ref 0.51–0.95)
DEPRECATED HCO3 PLAS-SCNC: 25 MMOL/L (ref 22–29)
EOSINOPHIL # BLD AUTO: 0.1 10E3/UL (ref 0–0.7)
EOSINOPHIL NFR BLD AUTO: 0 %
ERYTHROCYTE [DISTWIDTH] IN BLOOD BY AUTOMATED COUNT: 12 % (ref 10–15)
FLUAV AG SPEC QL IA: NEGATIVE
FLUBV AG SPEC QL IA: NEGATIVE
GFR SERPL CREATININE-BSD FRML MDRD: >90 ML/MIN/1.73M2
GLUCOSE SERPL-MCNC: 106 MG/DL (ref 70–99)
GLUCOSE UR STRIP-MCNC: NEGATIVE MG/DL
HCT VFR BLD AUTO: 40.6 % (ref 35–47)
HGB BLD-MCNC: 13.9 G/DL (ref 11.7–15.7)
HGB UR QL STRIP: ABNORMAL
HOLD SPECIMEN: NORMAL
IMM GRANULOCYTES # BLD: 0 10E3/UL
IMM GRANULOCYTES NFR BLD: 0 %
KETONES UR STRIP-MCNC: NEGATIVE MG/DL
LEUKOCYTE ESTERASE UR QL STRIP: NEGATIVE
LIPASE SERPL-CCNC: 15 U/L (ref 13–60)
LYMPHOCYTES # BLD AUTO: 1 10E3/UL (ref 0.8–5.3)
LYMPHOCYTES NFR BLD AUTO: 8 %
MCH RBC QN AUTO: 30.7 PG (ref 26.5–33)
MCHC RBC AUTO-ENTMCNC: 34.2 G/DL (ref 31.5–36.5)
MCV RBC AUTO: 90 FL (ref 78–100)
MONOCYTES # BLD AUTO: 0.4 10E3/UL (ref 0–1.3)
MONOCYTES NFR BLD AUTO: 4 %
MUCOUS THREADS #/AREA URNS LPF: PRESENT /LPF
NEUTROPHILS # BLD AUTO: 10.5 10E3/UL (ref 1.6–8.3)
NEUTROPHILS NFR BLD AUTO: 88 %
NITRATE UR QL: NEGATIVE
NRBC # BLD AUTO: 0 10E3/UL
NRBC BLD AUTO-RTO: 0 /100
PH UR STRIP: 7 [PH] (ref 5–7)
PLATELET # BLD AUTO: 292 10E3/UL (ref 150–450)
POTASSIUM SERPL-SCNC: 3.6 MMOL/L (ref 3.4–5.3)
PROT SERPL-MCNC: 7.4 G/DL (ref 6.4–8.3)
RBC # BLD AUTO: 4.53 10E6/UL (ref 3.8–5.2)
RBC URINE: 4 /HPF
SARS-COV-2 RNA RESP QL NAA+PROBE: NEGATIVE
SODIUM SERPL-SCNC: 133 MMOL/L (ref 136–145)
SP GR UR STRIP: 1.01 (ref 1–1.03)
SQUAMOUS EPITHELIAL: <1 /HPF
TROPONIN T SERPL HS-MCNC: <6 NG/L
UROBILINOGEN UR STRIP-MCNC: NORMAL MG/DL
WBC # BLD AUTO: 12 10E3/UL (ref 4–11)
WBC URINE: 0 /HPF

## 2023-04-09 PROCEDURE — 76705 ECHO EXAM OF ABDOMEN: CPT

## 2023-04-09 PROCEDURE — 258N000003 HC RX IP 258 OP 636: Performed by: FAMILY MEDICINE

## 2023-04-09 PROCEDURE — 250N000009 HC RX 250: Performed by: FAMILY MEDICINE

## 2023-04-09 PROCEDURE — 250N000011 HC RX IP 250 OP 636: Performed by: FAMILY MEDICINE

## 2023-04-09 PROCEDURE — 96361 HYDRATE IV INFUSION ADD-ON: CPT | Performed by: FAMILY MEDICINE

## 2023-04-09 PROCEDURE — 87804 INFLUENZA ASSAY W/OPTIC: CPT | Performed by: FAMILY MEDICINE

## 2023-04-09 PROCEDURE — 96375 TX/PRO/DX INJ NEW DRUG ADDON: CPT | Mod: 59 | Performed by: FAMILY MEDICINE

## 2023-04-09 PROCEDURE — 74177 CT ABD & PELVIS W/CONTRAST: CPT

## 2023-04-09 PROCEDURE — 81001 URINALYSIS AUTO W/SCOPE: CPT | Performed by: FAMILY MEDICINE

## 2023-04-09 PROCEDURE — 250N000013 HC RX MED GY IP 250 OP 250 PS 637: Performed by: FAMILY MEDICINE

## 2023-04-09 PROCEDURE — 96376 TX/PRO/DX INJ SAME DRUG ADON: CPT | Performed by: FAMILY MEDICINE

## 2023-04-09 RX ORDER — OXYCODONE AND ACETAMINOPHEN 5; 325 MG/1; MG/1
2 TABLET ORAL ONCE
Status: COMPLETED | OUTPATIENT
Start: 2023-04-09 | End: 2023-04-09

## 2023-04-09 RX ORDER — IOPAMIDOL 755 MG/ML
200 INJECTION, SOLUTION INTRAVASCULAR ONCE
Status: COMPLETED | OUTPATIENT
Start: 2023-04-09 | End: 2023-04-09

## 2023-04-09 RX ORDER — DIPHENHYDRAMINE HYDROCHLORIDE AND LIDOCAINE HYDROCHLORIDE AND ALUMINUM HYDROXIDE AND MAGNESIUM HYDRO
2.5-5 KIT EVERY 4 HOURS PRN
Status: DISCONTINUED | OUTPATIENT
Start: 2023-04-09 | End: 2023-04-09 | Stop reason: HOSPADM

## 2023-04-09 RX ORDER — HYDROMORPHONE HYDROCHLORIDE 1 MG/ML
0.5 INJECTION, SOLUTION INTRAMUSCULAR; INTRAVENOUS; SUBCUTANEOUS ONCE
Status: COMPLETED | OUTPATIENT
Start: 2023-04-09 | End: 2023-04-09

## 2023-04-09 RX ORDER — LORAZEPAM 2 MG/ML
1 INJECTION INTRAMUSCULAR ONCE
Status: COMPLETED | OUTPATIENT
Start: 2023-04-09 | End: 2023-04-09

## 2023-04-09 RX ADMIN — IOPAMIDOL 80 ML: 755 INJECTION, SOLUTION INTRAVENOUS at 00:19

## 2023-04-09 RX ADMIN — OXYCODONE HYDROCHLORIDE AND ACETAMINOPHEN 2 TABLET: 5; 325 TABLET ORAL at 03:32

## 2023-04-09 RX ADMIN — HYDROMORPHONE HYDROCHLORIDE 0.5 MG: 1 INJECTION, SOLUTION INTRAMUSCULAR; INTRAVENOUS; SUBCUTANEOUS at 01:07

## 2023-04-09 RX ADMIN — HYDROMORPHONE HYDROCHLORIDE 1 MG: 1 INJECTION, SOLUTION INTRAMUSCULAR; INTRAVENOUS; SUBCUTANEOUS at 00:04

## 2023-04-09 RX ADMIN — SODIUM CHLORIDE: 9 INJECTION, SOLUTION INTRAVENOUS at 01:07

## 2023-04-09 RX ADMIN — SODIUM CHLORIDE 60 ML: 9 INJECTION, SOLUTION INTRAVENOUS at 00:19

## 2023-04-09 RX ADMIN — LORAZEPAM 1 MG: 2 INJECTION INTRAMUSCULAR; INTRAVENOUS at 01:07

## 2023-04-09 ASSESSMENT — ACTIVITIES OF DAILY LIVING (ADL)
ADLS_ACUITY_SCORE: 35
ADLS_ACUITY_SCORE: 35

## 2023-04-09 NOTE — ED PROVIDER NOTES
History     Chief Complaint   Patient presents with     Abdominal Pain     HPI  Patricia Solano is a 37 year old female who presents to the ED tonight with chest and abdominal pain and fever that started about 7 PM tonight.  Began shortly after supper.  Cheesy potatoes and a shrimp salad.  Finds it difficult to localize the pain as she is quite uncomfortable.  Also has a bad tooth in the right upper.  Most of the history is obtained from her , [place].  She is nauseous but has not vomited.    Still has her gallbladder.  Status post hysterectomy.  Had an ectopic in the past which required surgery.  No other abdominal surgeries  Chronic neck pain and has a nerve stimulator.  Several neck surgeries prior to that.    Allergies:  Allergies   Allergen Reactions     Tdap [Daptacel] Other (See Comments)     Tachycardia, fever, local reaction, chills     Vancomycin Hives       Problem List:    Patient Active Problem List    Diagnosis Date Noted     Stridor 2019     Priority: Medium     Community acquired pneumonia of right lower lobe of lung 2018     Priority: Medium     Chronic neck pain 2018     Priority: Medium     Tachycardia 2018     Priority: Medium     Fever 2018     Priority: Medium     Headache, temporal 2018     Priority: Medium     CAP (community acquired pneumonia) 2018     Priority: Medium     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     Priority: Medium      (normal spontaneous vaginal delivery) 2014     Priority: Medium        Past Medical History:    Past Medical History:   Diagnosis Date     Breast disorder      Choriocarcinoma      Seizures (H)        Past Surgical History:    Past Surgical History:   Procedure Laterality Date     C RAD RESEC TONSIL/PILLARS      2006     CERVICAL ARTHOPLASTY/ ARTIFIC DISC, SINGLE       CYSTOSCOPY, BIOPSY BLADDER, COMBINED           D&C      ,,      "GYN SURGERY       HYSTERECTOMY  02/2019     LUMPECTOMY BREAST      2002       Family History:    Family History   Problem Relation Age of Onset     Cancer Maternal Grandmother         Pancreatic     Hypertension Paternal Grandfather      Thyroid Disease Paternal Grandfather      Thyroid Disease Paternal Grandmother      Obesity Paternal Grandmother      Arthritis Paternal Grandmother         RA     Kidney Disease Paternal Grandmother        Social History:  Marital Status:   [2]  Social History     Tobacco Use     Smoking status: Never     Smokeless tobacco: Never   Substance Use Topics     Alcohol use: No     Drug use: No        Medications:    amoxicillin-clavulanate (AUGMENTIN) 875-125 MG tablet  magic mouthwash (ENTER INGREDIENTS IN COMMENTS) suspension  acetaminophen (TYLENOL) 500 MG tablet  diphenhydrAMINE HCl (BENADRYL PO)  FLUoxetine (PROZAC) 10 MG capsule  tiZANidine (ZANAFLEX) 4 MG capsule  zolpidem (AMBIEN) 10 MG tablet          Review of Systems   Unable to perform ROS: Acuity of condition       Physical Exam   BP: (!) 136/90  Pulse: 113  Temp: (!) 102.1  F (38.9  C)  Resp: 18  Height: 157.5 cm (5' 2\")  Weight: 72.6 kg (160 lb)  SpO2: 100 %      Physical Exam  Constitutional:       General: She is in acute distress (moderate).   HENT:      Mouth/Throat:      Comments: Right upper molar has decay and a piece missing.  Shallow ulcer some mild surrounding erythema of the hard palate on the right.  Cardiovascular:      Rate and Rhythm: Regular rhythm. Tachycardia present.   Pulmonary:      Effort: Pulmonary effort is normal.      Breath sounds: Normal breath sounds.   Abdominal:      Tenderness: There is generalized abdominal tenderness ( mostly in upper abd).   Musculoskeletal:      Right lower leg: No edema.      Left lower leg: No edema.   Skin:     General: Skin is warm and dry.   Neurological:      Mental Status: She is alert. She is disoriented.   Psychiatric:         Mood and Affect: Mood " normal.         ED Course                 Procedures              EKG Interpretation:      Interpreted by Travis Rosas MD  Time reviewed: 2351  Symptoms at time of EKG: Chest and abdominal pain  Rhythm: sinus tachycardia  Rate: 113  Axis: Normal  Ectopy: none  Conduction: normal  ST Segments/ T Waves: Non-specific ST-T wave changes  Q Waves: none  Comparison to prior: No old EKG available    Clinical Impression: no acute changes                Critical Care time:  none               Results for orders placed or performed during the hospital encounter of 04/08/23 (from the past 24 hour(s))   [place] Draw    Narrative    The following orders were created for panel order [place] Draw.  Procedure                               Abnormality         Status                     ---------                               -----------         ------                     Extra Blood Culture Bottle[[id number]]                                                  Extra Red Top Tube[[id number]]                               Final result               Extra Green Top (Lithium...[[id number]]                      Final result               Extra Purple Top Tube[[id number]]                            Final result               Extra Heparinized Syringe[[id number]]                        Final result               Extra Green Top (Lithium...[[id number]]                      Final result                 Please view results for these tests on the individual orders.   Extra Red Top Tube   Result Value Ref Range    Hold Specimen JIC    Extra Green Top (Lithium Heparin) Tube   Result Value Ref Range    Hold Specimen JIC    Extra Purple Top Tube   Result Value Ref Range    Hold Specimen JIC    Extra Heparinized Syringe   Result Value Ref Range    Hold Specimen JIC    Extra Green Top (Lithium Heparin) ON ICE   Result Value Ref Range    Hold Specimen JIC    CBC with platelets differential    Narrative    The following orders were created for panel  order CBC with platelets differential.  Procedure                               Abnormality         Status                     ---------                               -----------         ------                     CBC with platelets and d...[[id number]]  Abnormal            Final result                 Please view results for these tests on the individual orders.   Comprehensive metabolic panel   Result Value Ref Range    Sodium 133 (L) 136 - 145 mmol/L    Potassium 3.6 3.4 - 5.3 mmol/L    Chloride 98 98 - 107 mmol/L    Carbon Dioxide (CO2) 25 22 - 29 mmol/L    Anion Gap 10 7 - 15 mmol/L    Urea Nitrogen 9.7 6.0 - 20.0 mg/dL    Creatinine 0.81 0.51 - 0.95 mg/dL    Calcium 9.4 8.6 - 10.0 mg/dL    Glucose 106 (H) 70 - 99 mg/dL    Alkaline Phosphatase 137 (H) 35 - 104 U/L    AST 36 (H) 10 - 35 U/L    ALT 33 10 - 35 U/L    Protein Total 7.4 6.4 - 8.3 g/dL    Albumin 4.2 3.5 - 5.2 g/dL    Bilirubin Total 0.4 <=1.2 mg/dL    GFR Estimate >90 >60 mL/min/1.73m2   Lipase   Result Value Ref Range    Lipase 15 13 - 60 U/L   Lactic acid whole blood   Result Value Ref Range    Lactic Acid 1.0 0.7 - 2.0 mmol/L   Blood gas venous   Result Value Ref Range    pH Venous 7.45 (H) 7.32 - 7.43    pCO2 Venous 41 40 - 50 mm Hg    pO2 Venous 25 25 - 47 mm Hg    Bicarbonate Venous 28 21 - 28 mmol/L    Base Excess/Deficit (+/-) 3.9 (H) -7.7 - 1.9 mmol/L    FIO2 21    Troponin T, High Sensitivity   Result Value Ref Range    Troponin T, High Sensitivity <6 <=14 ng/L   CBC with platelets and differential   Result Value Ref Range    WBC Count 12.0 (H) 4.0 - 11.0 10e3/uL    RBC Count 4.53 3.80 - 5.20 10e6/uL    Hemoglobin 13.9 11.7 - 15.7 g/dL    Hematocrit 40.6 35.0 - 47.0 %    MCV 90 78 - 100 fL    MCH 30.7 26.5 - 33.0 pg    MCHC 34.2 31.5 - 36.5 g/dL    RDW 12.0 10.0 - 15.0 %    Platelet Count 292 150 - 450 10e3/uL    % Neutrophils 88 %    % Lymphocytes 8 %    % Monocytes 4 %    % Eosinophils 0 %    % Basophils 0 %    % Immature Granulocytes 0 %     NRBCs per 100 WBC 0 <1 /100    Absolute Neutrophils 10.5 (H) 1.6 - 8.3 10e3/uL    Absolute Lymphocytes 1.0 0.8 - 5.3 10e3/uL    Absolute Monocytes 0.4 0.0 - 1.3 10e3/uL    Absolute Eosinophils 0.1 0.0 - 0.7 10e3/uL    Absolute Basophils 0.0 0.0 - 0.2 10e3/uL    Absolute Immature Granulocytes 0.0 <=0.4 10e3/uL    Absolute NRBCs 0.0 10e3/uL   Symptomatic COVID-19 Virus (Coronavirus) by PCR Nose    Specimen: Nose; Swab   Result Value Ref Range    SARS CoV2 PCR Negative Negative    Narrative    Testing was performed using the [employer] Xpress SARS-CoV-2 Assay on the Cepheid Gene-Xpert Instrument Systems. Additional information about this Emergency Use Authorization (EUA) assay can be found via the Lab Guide. This test should be ordered for the detection of SARS-CoV-2 in individuals who meet SARS-CoV-2 clinical and/or epidemiological criteria as well as from individuals without symptoms or other reasons to suspect COVID-19. Test performance for asymptomatic patients has only been established in anterior nasal swab specimens. This test is for in vitro diagnostic use under the FDA EUA for laboratories certified under CLIA to perform high complexity testing. This test has not been FDA cleared or approved. A negative result does not rule out the presence of PCR inhibitors in the specimen or target RNA concentration below the limit of detection for the assay. The possibility of a false negative should be considered if the patient's recent exposure or clinical presentation suggests COVID-19. This test was validated by the [facility]. This laboratory is certified under the Clinical Laboratory Improvement Amendments (CLIA) as qualified to perform high complexity laboratory testing.     [place] Draw    Narrative    The following orders were created for panel order [place] Draw.  Procedure                               Abnormality         Status                     ---------                                -----------         ------                     Extra Blue Top Tube[[id number]]                              Final result                 Please view results for these tests on the individual orders.   Extra Blue Top Tube   Result Value Ref Range    Hold Specimen JIC    UA with Microscopic reflex to Culture    Specimen: Urine, Catheter   Result Value Ref Range    Color Urine Straw Colorless, Straw, Light Yellow, Yellow    Appearance Urine Clear Clear    Glucose Urine Negative Negative mg/dL    Bilirubin Urine Negative Negative    Ketones Urine Negative Negative mg/dL    Specific Gravity Urine 1.012 1.003 - 1.035    Blood Urine Moderate (A) Negative    pH Urine 7.0 5.0 - 7.0    Protein Albumin Urine Negative Negative mg/dL    Urobilinogen Urine Normal Normal, 2.0 mg/dL    Nitrite Urine Negative Negative    Leukocyte Esterase Urine Negative Negative    Mucus Urine Present (A) None Seen /LPF    RBC Urine 4 (H) <=2 /HPF    WBC Urine 0 <=5 /HPF    Squamous Epithelials Urine <1 <=1 /HPF    Narrative    Urine Culture not indicated   CT Chest/Abdomen/Pelvis w Contrast    Narrative    EXAM: CT CHEST/ABDOMEN/PELVIS W CONTRAST  LOCATION: [facility] [place]  DATE/TIME: 4/9/2023 12:36 AM    INDICATION: chest upper abd pain, fever  COMPARISON: Chest CT 08/03/2004  TECHNIQUE: CT scan of the chest, abdomen, and pelvis was performed following injection of IV contrast. Multiplanar reformats were obtained. Dose reduction techniques were used.   CONTRAST: 80 mL Isovue 370    FINDINGS:   LUNGS AND PLEURA: Lungs are clear, no focal pneumonia. Mild motion artifact.    MEDIASTINUM/AXILLAE: Normal.    CORONARY ARTERY CALCIFICATION: None.    HEPATOBILIARY: Normal.    PANCREAS: Normal.    SPLEEN: Normal.    ADRENAL GLANDS: Normal.    KIDNEYS/BLADDER: Normal.    BOWEL: No obstruction or inflammatory change. No inflammatory changes of the appendix. Moderate stool throughout the length of  colon.    LYMPH NODES: Normal.    VASCULATURE: Unremarkable.    PELVIC ORGANS: Hysterectomy. No adnexal lesions. No free fluid.    MUSCULOSKELETAL: Cervical spine level neurostimulator leads. Previous C-spine fusion. No suspicious bony lesion..      Impression    IMPRESSION:  1.  No etiology for symptoms. No inflammatory focus identified.   Influenza A/B antigen    Specimen: Nose; Swab   Result Value Ref Range    Influenza A antigen Negative Negative    Influenza B antigen Negative Negative    Narrative    Test results must be correlated with clinical data. If necessary, results should be confirmed by a molecular assay or viral culture.   Abdomen US, limited (RUQ only)    Narrative    EXAM: US ABDOMEN LIMITED  LOCATION: [facility]  DATE/TIME: 4/9/2023 1:44 AM    INDICATION: Upper abdominal pain, fever, elevated LFTs  COMPARISON: CT 04/09/2023  TECHNIQUE: Limited abdominal ultrasound.    FINDINGS:    GALLBLADDER: Decompressed. No wall thickening, stones, or focal tenderness.    BILE DUCTS: No biliary dilatation. The common duct measures 1 mm.    LIVER: Increased echogenicity. Normal smooth contour. No focal mass.    RIGHT KIDNEY: No hydronephrosis.    PANCREAS: The visualized portions are normal.    No ascites.      Impression    IMPRESSION:  1.  Hepatic steatosis.           Medications   0.9% sodium chloride BOLUS (0 mLs Intravenous Stopped 4/9/23 0106)     Followed by   sodium chloride 0.9% infusion (0 mLs Intravenous Stopped 4/9/23 0250)   ondansetron (ZOFRAN) injection 4 mg (4 mg Intravenous $Given 4/8/23 2340)   amoxicillin-clavulanate (AUGMENTIN) 875-125 MG per tablet 1 tablet (has no administration in time range)   ketorolac (TORADOL) injection 30 mg (30 mg Intravenous $Given 4/8/23 2340)   LORazepam (ATIVAN) injection 1 mg (1 mg Intravenous $Given 4/8/23 2340)   piperacillin-tazobactam (ZOSYN) 4.5 g vial to attach to  mL bag (0 g Intravenous Stopped 4/9/23 0017)    HYDROmorphone (DILAUDID) injection 1 mg (1 mg Intravenous $Given 4/9/23 0004)   iopamidol (ISOVUE-370) solution 200 mL (80 mLs Intravenous $Given 4/9/23 0019)   sodium chloride 0.9 % bag 100mL for CT scan flush use (60 mLs Intravenous $Given 4/9/23 0019)   HYDROmorphone (PF) (DILAUDID) injection 0.5 mg (0.5 mg Intravenous $Given 4/9/23 0107)   LORazepam (ATIVAN) injection 1 mg (1 mg Intravenous $Given 4/9/23 0107)       Assessments & Plan (with Medical Decision Making)  37-year-old female with cute onset of upper abdominal pain and fever starting around 7 PM tonight after supper.  Complains of chest pain and pain up into her throat as well.  Some nausea but no vomiting.  Still has her gallbladder.  She is tachycardic and febrile and meet SIRS criteria.  IV placed.  1 L normal saline.  Toradol, Zofran.  Chest and abdominal CT.  Culture sent.  She is quite tender in her upper abdomen, concern for gallbladder etiology.  Zosyn ordered.  Pain persistence of Dilaudid and Ativan were given.  CAP CT was unremarkable.  Alk phos is up just slightly.  Right upper quadrant ultrasound ordered to look for any evidence of acute cholecystitis.  EKG and troponin were unremarkable.  COVID-negative.  UA shows 4 red cells otherwise unremarkable.  Pain worsening again.  Dilaudid and Ativan repeated.  White count up slightly at 12.0 but her lactic acid is normal.  Other than mild LFT elevation, the rest of her labs are really unremarkable.  Right upper quadrant ultrasound was unremarkable except for fatty liver, likely explains her mild LFT elevation.  No evidence for acute cholecystitis.  The only source of infection I am finding on exam is her right upper molar.  She received a dose of Zosyn and admitted put her on oral Augmentin.  3 tablets sent to use until Monday after the holiday when the pharmacy is open back up again.  She is on a pain contract and has Percocet and morphine at home.  They were visiting family for [facility] and  "plan on heading back this morning when the kids wake up.  She did not bring her pain meds.  2 Percocet tablets were sent with.  Etiology of the abdominal pain is not clear but the only thing and finding is a fair amount of stool especially in the right colon.  Constipation secondary to chronic opioid use might be playing a role.  I suggested MiraLAX to \"cleanout\" and take that out of the equation.  They are quite familiar with that.  She has not had a bowel movement in a couple of days and usually goes a couple times a week.  Does not explain the fever but she may have an underlying viral infection in addition to her tooth infection.     states that she always has microscopic hematuria ever since she had choriocarcinoma and went through chemo.  Subsequently had a hysterectomy.  No evidence of ureteral calculi or hydronephrosis.  We ruled out lots of bad things tonight.  We discussed using time as a diagnostic tool and see how things go over the next 12-24 hours.  I asked her to recheck in clinic with her primary physician if persistent problems and return to the ED if she worsens or has any concerns.  We discussed reportable signs when to return.  Verbal and written discharge instructions given.  She and her  are comfortable with this plan.       I have reviewed the nursing notes.    I have reviewed the findings, diagnosis, plan and need for follow up with the patient.           Medical Decision Making  The patient's presentation was of moderate complexity (an undiagnosed new problem with uncertain diagnosis).    The patient's evaluation involved:  ordering and/or review of 3+ test(s) in this encounter (see separate area of note for details)    The patient's management necessitated moderate risk (prescription drug management including medications given in the ED).        New Prescriptions    AMOXICILLIN-CLAVULANATE (AUGMENTIN) 875-125 MG TABLET    Take 1 tablet by mouth 2 times daily for 10 days    " MAGIC MOUTHWASH (ENTER INGREDIENTS IN COMMENTS) SUSPENSION    Take 2.5-5 mLs by mouth every 4 hours as needed       Final diagnoses:   Abdominal pain, generalized - upper and RLQ   Dental disorder - right upper molar decay   Hard palate ulcer   Febrile illness, acute   Drug-induced constipation - chronic opiod use       4/8/2023   [facility] EMERGENCY DEPT     Travis Rosas MD  04/09/23 0256

## 2023-04-09 NOTE — ED NOTES
Labs including one set of blood cultures drawn prior to antibiotic administration. Times do not reflect this due to delay in processing by lab. Lab has been notified multiple times of blood and existing orders.

## 2023-04-09 NOTE — ED NOTES
0327: Take home rx including Augmentin and Magic mouthwash provided to pt. Awaiting Percocet order prior to formal discharge.

## 2023-04-09 NOTE — DISCHARGE INSTRUCTIONS
"Take the Augmentin twice a day for your tooth infection.  You can use the Magic mouthwash as needed for your mouth pain.  Use the MiraLAX to \"clean out\".     Mix 4 capfuls of MiraLAX in a 32 ounce bottle of Gatorade or Powerade, flavor of your choice.  Label it, so no one else gets a surprise, and keep it in the refrigerator.  You can drink up to 8 oz every 15 minutes and titrate to effect.  (The colonoscopy prep dose is 8 oz every 15 minutes until you have consumed two quarts)  You do not need to be that aggressive but know that you can take it more than just once or twice a day.  When it starts working, you can ease back on it.  You may continue with the rest of your current medications as prescribed.  See a dentist as soon as possible.  Recheck in clinic with your primary physician early this week if persistent problems.  Return to the ED if you worsen or have any concerns.  It was nice visiting with both of you tonight.  I hope this clears up quickly for you.    Thank you for choosing [facility]. We appreciate the opportunity to meet your urgent medical needs. Please let us know if we could have done anything to make your stay more satisfying.    After discharge, please closely monitor for any new or worsening symptoms. Return to the Emergency Department if you develop any acute worsening signs or symptoms.    If you had lab work, cultures or imaging studies done during your stay, the final results may still be pending. We will call you if your plan of care needs to change. However, if you are not improving as expected, please follow up with your primary care provider or clinic.     Start any prescription medications that were prescribed to you and take them as directed.     Please see additional handouts that may be pertinent to your condition.      "

## 2023-04-09 NOTE — ED NOTES
"Pt spouse out to desk stating \"she can't talk. Is that normal?\" Writer immediately into the room, pt appeared to be sleeping. She then opened her eyes to her name being called. Mumbling, with speech that was quickly fully coherent. Pt and spouse reassured of normal vital signs and plan to wait for remainder of diagnostics to result. Pt spouse concerned about pt having \"thrush and being airlifted out again.\" Writer assured that even if pt has thrush, she is not to any point of requiring critical airway interventions such as intubation or emergent transfer out of [facility]. Pt continues to repeatedly request water. Rationale for denying request explained, 1 mouth swab provided until CT scan is resulted.  "

## 2023-04-09 NOTE — ED TRIAGE NOTES
Pt here with abdominal pain and altered level of conc.    Started around 7pm tonight         Triage Assessment     Row Name 04/08/23 8780       Triage Assessment (Adult)    Airway WDL WDL       Respiratory WDL    Respiratory WDL WDL

## 2023-04-14 LAB — BACTERIA BLD CULT: NO GROWTH

## 2025-06-09 ENCOUNTER — TRANSFERRED RECORDS (OUTPATIENT)
Dept: HEALTH INFORMATION MANAGEMENT | Facility: CLINIC | Age: 40
End: 2025-06-09